# Patient Record
Sex: FEMALE | Employment: UNEMPLOYED | URBAN - METROPOLITAN AREA
[De-identification: names, ages, dates, MRNs, and addresses within clinical notes are randomized per-mention and may not be internally consistent; named-entity substitution may affect disease eponyms.]

---

## 2021-01-13 ENCOUNTER — OFFICE VISIT (OUTPATIENT)
Dept: URGENT CARE | Facility: CLINIC | Age: 26
End: 2021-01-13
Payer: MEDICAID

## 2021-01-13 VITALS
SYSTOLIC BLOOD PRESSURE: 107 MMHG | RESPIRATION RATE: 18 BRPM | OXYGEN SATURATION: 98 % | DIASTOLIC BLOOD PRESSURE: 69 MMHG | HEART RATE: 76 BPM | TEMPERATURE: 99 F | HEIGHT: 61 IN | WEIGHT: 135 LBS | BODY MASS INDEX: 25.49 KG/M2

## 2021-01-13 DIAGNOSIS — L30.9 HAND ECZEMA: ICD-10-CM

## 2021-01-13 DIAGNOSIS — H00.024 HORDEOLUM INTERNUM LEFT UPPER EYELID: Primary | ICD-10-CM

## 2021-01-13 PROCEDURE — 99203 OFFICE O/P NEW LOW 30 MIN: CPT | Mod: S$GLB,,, | Performed by: PHYSICIAN ASSISTANT

## 2021-01-13 PROCEDURE — 99203 PR OFFICE/OUTPT VISIT, NEW, LEVL III, 30-44 MIN: ICD-10-PCS | Mod: S$GLB,,, | Performed by: PHYSICIAN ASSISTANT

## 2021-01-13 RX ORDER — POLYMYXIN B SULFATE AND TRIMETHOPRIM 1; 10000 MG/ML; [USP'U]/ML
1 SOLUTION OPHTHALMIC EVERY 6 HOURS
Qty: 10 ML | Refills: 0 | Status: SHIPPED | OUTPATIENT
Start: 2021-01-13 | End: 2021-01-20

## 2021-01-13 RX ORDER — TRIAMCINOLONE ACETONIDE 1 MG/G
CREAM TOPICAL 2 TIMES DAILY
Qty: 45 G | Refills: 0 | Status: SHIPPED | OUTPATIENT
Start: 2021-01-13 | End: 2021-01-23

## 2021-02-14 ENCOUNTER — OFFICE VISIT (OUTPATIENT)
Dept: URGENT CARE | Facility: CLINIC | Age: 26
End: 2021-02-14
Payer: MEDICAID

## 2021-02-14 VITALS
OXYGEN SATURATION: 98 % | WEIGHT: 132 LBS | DIASTOLIC BLOOD PRESSURE: 68 MMHG | TEMPERATURE: 98 F | HEIGHT: 61 IN | BODY MASS INDEX: 24.92 KG/M2 | SYSTOLIC BLOOD PRESSURE: 96 MMHG | RESPIRATION RATE: 18 BRPM | HEART RATE: 78 BPM

## 2021-02-14 DIAGNOSIS — H00.024 HORDEOLUM INTERNUM LEFT UPPER EYELID: Primary | ICD-10-CM

## 2021-02-14 PROCEDURE — 99213 OFFICE O/P EST LOW 20 MIN: CPT | Mod: S$GLB,,, | Performed by: NURSE PRACTITIONER

## 2021-02-14 PROCEDURE — 99213 PR OFFICE/OUTPT VISIT, EST, LEVL III, 20-29 MIN: ICD-10-PCS | Mod: S$GLB,,, | Performed by: NURSE PRACTITIONER

## 2021-02-14 RX ORDER — ERYTHROMYCIN 5 MG/G
OINTMENT OPHTHALMIC 3 TIMES DAILY
Qty: 3.5 G | Refills: 0 | Status: SHIPPED | OUTPATIENT
Start: 2021-02-14 | End: 2021-02-19

## 2021-04-05 ENCOUNTER — HOSPITAL ENCOUNTER (EMERGENCY)
Facility: HOSPITAL | Age: 26
Discharge: HOME | End: 2021-04-05
Attending: EMERGENCY MEDICINE
Payer: MEDICAID

## 2021-04-05 VITALS
HEIGHT: 61 IN | TEMPERATURE: 98 F | SYSTOLIC BLOOD PRESSURE: 103 MMHG | RESPIRATION RATE: 17 BRPM | BODY MASS INDEX: 25.49 KG/M2 | HEART RATE: 77 BPM | WEIGHT: 135 LBS | DIASTOLIC BLOOD PRESSURE: 55 MMHG | OXYGEN SATURATION: 99 %

## 2021-04-05 DIAGNOSIS — N88.8 CERVICAL DISCHARGE: ICD-10-CM

## 2021-04-05 DIAGNOSIS — N83.201 RIGHT OVARIAN CYST: Primary | ICD-10-CM

## 2021-04-05 LAB
ALBUMIN SERPL BCP-MCNC: 4.6 G/DL (ref 3.5–5.2)
ALP SERPL-CCNC: 74 U/L (ref 55–135)
ALT SERPL W/O P-5'-P-CCNC: 11 U/L (ref 10–44)
ANION GAP SERPL CALC-SCNC: 9 MMOL/L (ref 8–16)
AST SERPL-CCNC: 24 U/L (ref 10–40)
B-HCG UR QL: NEGATIVE
BACTERIA GENITAL QL WET PREP: ABNORMAL
BASOPHILS # BLD AUTO: 0.04 K/UL (ref 0–0.2)
BASOPHILS NFR BLD: 0.6 % (ref 0–1.9)
BILIRUB SERPL-MCNC: 0.2 MG/DL (ref 0.1–1)
BILIRUB UR QL STRIP: NEGATIVE
BUN SERPL-MCNC: 24 MG/DL (ref 6–20)
CALCIUM SERPL-MCNC: 8.8 MG/DL (ref 8.7–10.5)
CHLORIDE SERPL-SCNC: 103 MMOL/L (ref 95–110)
CLARITY UR: CLEAR
CLUE CELLS VAG QL WET PREP: ABNORMAL
CO2 SERPL-SCNC: 22 MMOL/L (ref 23–29)
COLOR UR: YELLOW
CREAT SERPL-MCNC: 0.7 MG/DL (ref 0.5–1.4)
CTP QC/QA: YES
DIFFERENTIAL METHOD: ABNORMAL
EOSINOPHIL # BLD AUTO: 0.1 K/UL (ref 0–0.5)
EOSINOPHIL NFR BLD: 1.4 % (ref 0–8)
ERYTHROCYTE [DISTWIDTH] IN BLOOD BY AUTOMATED COUNT: 12.9 % (ref 11.5–14.5)
EST. GFR  (AFRICAN AMERICAN): >60 ML/MIN/1.73 M^2
EST. GFR  (NON AFRICAN AMERICAN): >60 ML/MIN/1.73 M^2
FILAMENT FUNGI VAG WET PREP-#/AREA: ABNORMAL
GLUCOSE SERPL-MCNC: 92 MG/DL (ref 70–110)
GLUCOSE UR QL STRIP: NEGATIVE
HCT VFR BLD AUTO: 39.5 % (ref 37–48.5)
HGB BLD-MCNC: 12.6 G/DL (ref 12–16)
HGB UR QL STRIP: NEGATIVE
IMM GRANULOCYTES # BLD AUTO: 0.01 K/UL (ref 0–0.04)
IMM GRANULOCYTES NFR BLD AUTO: 0.1 % (ref 0–0.5)
KETONES UR QL STRIP: NEGATIVE
LEUKOCYTE ESTERASE UR QL STRIP: NEGATIVE
LYMPHOCYTES # BLD AUTO: 2 K/UL (ref 1–4.8)
LYMPHOCYTES NFR BLD: 27 % (ref 18–48)
MCH RBC QN AUTO: 26.3 PG (ref 27–31)
MCHC RBC AUTO-ENTMCNC: 31.9 G/DL (ref 32–36)
MCV RBC AUTO: 82 FL (ref 82–98)
MONOCYTES # BLD AUTO: 0.7 K/UL (ref 0.3–1)
MONOCYTES NFR BLD: 9.5 % (ref 4–15)
NEUTROPHILS # BLD AUTO: 4.5 K/UL (ref 1.8–7.7)
NEUTROPHILS NFR BLD: 61.4 % (ref 38–73)
NITRITE UR QL STRIP: NEGATIVE
NRBC BLD-RTO: 0 /100 WBC
PH UR STRIP: 5 [PH] (ref 5–8)
PLATELET # BLD AUTO: 208 K/UL (ref 150–450)
PMV BLD AUTO: 12.3 FL (ref 9.2–12.9)
POTASSIUM SERPL-SCNC: 4.7 MMOL/L (ref 3.5–5.1)
PROT SERPL-MCNC: 7.8 G/DL (ref 6–8.4)
PROT UR QL STRIP: NEGATIVE
RBC # BLD AUTO: 4.8 M/UL (ref 4–5.4)
SODIUM SERPL-SCNC: 134 MMOL/L (ref 136–145)
SP GR UR STRIP: 1.03 (ref 1–1.03)
SPECIMEN SOURCE: ABNORMAL
T VAGINALIS GENITAL QL WET PREP: ABNORMAL
URN SPEC COLLECT METH UR: NORMAL
UROBILINOGEN UR STRIP-ACNC: NEGATIVE EU/DL
WBC # BLD AUTO: 7.26 K/UL (ref 3.9–12.7)
WBC #/AREA VAG WET PREP: ABNORMAL
YEAST GENITAL QL WET PREP: ABNORMAL

## 2021-04-05 PROCEDURE — 85025 COMPLETE CBC W/AUTO DIFF WBC: CPT | Performed by: EMERGENCY MEDICINE

## 2021-04-05 PROCEDURE — 96374 THER/PROPH/DIAG INJ IV PUSH: CPT

## 2021-04-05 PROCEDURE — 99285 EMERGENCY DEPT VISIT HI MDM: CPT | Mod: 25

## 2021-04-05 PROCEDURE — 25500020 PHARM REV CODE 255: Performed by: EMERGENCY MEDICINE

## 2021-04-05 PROCEDURE — 81025 URINE PREGNANCY TEST: CPT | Performed by: EMERGENCY MEDICINE

## 2021-04-05 PROCEDURE — 80053 COMPREHEN METABOLIC PANEL: CPT | Performed by: EMERGENCY MEDICINE

## 2021-04-05 PROCEDURE — 87210 SMEAR WET MOUNT SALINE/INK: CPT | Performed by: EMERGENCY MEDICINE

## 2021-04-05 PROCEDURE — 87491 CHLMYD TRACH DNA AMP PROBE: CPT | Performed by: EMERGENCY MEDICINE

## 2021-04-05 PROCEDURE — 96375 TX/PRO/DX INJ NEW DRUG ADDON: CPT

## 2021-04-05 PROCEDURE — 81003 URINALYSIS AUTO W/O SCOPE: CPT | Performed by: EMERGENCY MEDICINE

## 2021-04-05 PROCEDURE — 87591 N.GONORRHOEAE DNA AMP PROB: CPT | Performed by: EMERGENCY MEDICINE

## 2021-04-05 PROCEDURE — 63600175 PHARM REV CODE 636 W HCPCS: Performed by: EMERGENCY MEDICINE

## 2021-04-05 RX ORDER — ONDANSETRON 2 MG/ML
4 INJECTION INTRAMUSCULAR; INTRAVENOUS
Status: COMPLETED | OUTPATIENT
Start: 2021-04-05 | End: 2021-04-05

## 2021-04-05 RX ORDER — TRAMADOL HYDROCHLORIDE 50 MG/1
50 TABLET ORAL EVERY 8 HOURS PRN
Qty: 6 TABLET | Refills: 0 | Status: SHIPPED | OUTPATIENT
Start: 2021-04-05

## 2021-04-05 RX ORDER — KETOROLAC TROMETHAMINE 30 MG/ML
15 INJECTION, SOLUTION INTRAMUSCULAR; INTRAVENOUS
Status: COMPLETED | OUTPATIENT
Start: 2021-04-05 | End: 2021-04-05

## 2021-04-05 RX ORDER — NAPROXEN 500 MG/1
500 TABLET ORAL 2 TIMES DAILY PRN
Qty: 30 TABLET | Refills: 0 | OUTPATIENT
Start: 2021-04-05 | End: 2021-08-11

## 2021-04-05 RX ORDER — FENTANYL CITRATE 50 UG/ML
50 INJECTION, SOLUTION INTRAMUSCULAR; INTRAVENOUS
Status: COMPLETED | OUTPATIENT
Start: 2021-04-05 | End: 2021-04-05

## 2021-04-05 RX ADMIN — IOHEXOL 75 ML: 350 INJECTION, SOLUTION INTRAVENOUS at 07:04

## 2021-04-05 RX ADMIN — FENTANYL CITRATE 50 MCG: 50 INJECTION, SOLUTION INTRAMUSCULAR; INTRAVENOUS at 07:04

## 2021-04-05 RX ADMIN — ONDANSETRON 4 MG: 2 INJECTION INTRAMUSCULAR; INTRAVENOUS at 08:04

## 2021-04-05 RX ADMIN — KETOROLAC TROMETHAMINE 15 MG: 30 INJECTION, SOLUTION INTRAMUSCULAR; INTRAVENOUS at 07:04

## 2021-04-06 LAB
C TRACH DNA SPEC QL NAA+PROBE: NOT DETECTED
N GONORRHOEA DNA SPEC QL NAA+PROBE: NOT DETECTED

## 2021-07-30 ENCOUNTER — OFFICE VISIT (OUTPATIENT)
Dept: OBSTETRICS AND GYNECOLOGY | Facility: CLINIC | Age: 26
End: 2021-07-30
Payer: MEDICAID

## 2021-07-30 VITALS
HEIGHT: 61 IN | DIASTOLIC BLOOD PRESSURE: 80 MMHG | BODY MASS INDEX: 25.04 KG/M2 | WEIGHT: 132.63 LBS | SYSTOLIC BLOOD PRESSURE: 118 MMHG

## 2021-07-30 DIAGNOSIS — N88.8 CERVICAL DISCHARGE: ICD-10-CM

## 2021-07-30 DIAGNOSIS — Z11.3 SCREENING FOR STD (SEXUALLY TRANSMITTED DISEASE): Primary | ICD-10-CM

## 2021-07-30 DIAGNOSIS — Z01.419 WELL WOMAN EXAM WITH ROUTINE GYNECOLOGICAL EXAM: ICD-10-CM

## 2021-07-30 DIAGNOSIS — Z30.45 ENCOUNTER FOR SURVEILLANCE OF TRANSDERMAL PATCH HORMONAL CONTRACEPTIVE DEVICE: ICD-10-CM

## 2021-07-30 DIAGNOSIS — N83.201 RIGHT OVARIAN CYST: ICD-10-CM

## 2021-07-30 PROCEDURE — 87591 N.GONORRHOEAE DNA AMP PROB: CPT | Performed by: OBSTETRICS & GYNECOLOGY

## 2021-07-30 PROCEDURE — 81025 URINE PREGNANCY TEST: CPT | Mod: PBBFAC,PO | Performed by: OBSTETRICS & GYNECOLOGY

## 2021-07-30 PROCEDURE — 88175 CYTOPATH C/V AUTO FLUID REDO: CPT | Performed by: OBSTETRICS & GYNECOLOGY

## 2021-07-30 PROCEDURE — 99213 OFFICE O/P EST LOW 20 MIN: CPT | Mod: PBBFAC,PO | Performed by: OBSTETRICS & GYNECOLOGY

## 2021-07-30 PROCEDURE — 87491 CHLMYD TRACH DNA AMP PROBE: CPT | Performed by: OBSTETRICS & GYNECOLOGY

## 2021-07-30 PROCEDURE — 99385 PREV VISIT NEW AGE 18-39: CPT | Mod: S$PBB,,, | Performed by: OBSTETRICS & GYNECOLOGY

## 2021-07-30 PROCEDURE — 99385 PR PREVENTIVE VISIT,NEW,18-39: ICD-10-PCS | Mod: S$PBB,,, | Performed by: OBSTETRICS & GYNECOLOGY

## 2021-07-30 PROCEDURE — 99999 PR PBB SHADOW E&M-EST. PATIENT-LVL III: ICD-10-PCS | Mod: PBBFAC,,, | Performed by: OBSTETRICS & GYNECOLOGY

## 2021-07-30 PROCEDURE — 99999 PR PBB SHADOW E&M-EST. PATIENT-LVL III: CPT | Mod: PBBFAC,,, | Performed by: OBSTETRICS & GYNECOLOGY

## 2021-07-30 RX ORDER — NORELGESTROMIN AND ETHINYL ESTRADIOL 35; 150 UG/MG; UG/MG
1 PATCH TRANSDERMAL
Qty: 4 PATCH | Refills: 11 | Status: SHIPPED | OUTPATIENT
Start: 2021-07-30 | End: 2022-07-30

## 2021-08-04 LAB
C TRACH DNA SPEC QL NAA+PROBE: NOT DETECTED
N GONORRHOEA DNA SPEC QL NAA+PROBE: NOT DETECTED

## 2021-08-07 LAB
FINAL PATHOLOGIC DIAGNOSIS: NORMAL
Lab: NORMAL

## 2021-08-08 ENCOUNTER — HOSPITAL ENCOUNTER (EMERGENCY)
Facility: HOSPITAL | Age: 26
Discharge: HOME | End: 2021-08-08
Attending: EMERGENCY MEDICINE
Payer: MEDICAID

## 2021-08-08 VITALS
OXYGEN SATURATION: 99 % | BODY MASS INDEX: 25.49 KG/M2 | SYSTOLIC BLOOD PRESSURE: 102 MMHG | RESPIRATION RATE: 15 BRPM | HEART RATE: 87 BPM | WEIGHT: 135 LBS | TEMPERATURE: 99 F | HEIGHT: 61 IN | DIASTOLIC BLOOD PRESSURE: 60 MMHG

## 2021-08-08 DIAGNOSIS — S29.011A MUSCLE STRAIN OF CHEST WALL, INITIAL ENCOUNTER: Primary | ICD-10-CM

## 2021-08-08 DIAGNOSIS — R07.9 CHEST PAIN: ICD-10-CM

## 2021-08-08 DIAGNOSIS — R52 PAIN: ICD-10-CM

## 2021-08-08 LAB
B-HCG UR QL: NEGATIVE
CTP QC/QA: YES
CTP QC/QA: YES
SARS-COV-2 RDRP RESP QL NAA+PROBE: NEGATIVE

## 2021-08-08 PROCEDURE — 99284 EMERGENCY DEPT VISIT MOD MDM: CPT | Mod: 25

## 2021-08-08 PROCEDURE — 93010 EKG 12-LEAD: ICD-10-PCS | Mod: ,,, | Performed by: INTERNAL MEDICINE

## 2021-08-08 PROCEDURE — 93005 ELECTROCARDIOGRAM TRACING: CPT

## 2021-08-08 PROCEDURE — 25000003 PHARM REV CODE 250: Performed by: EMERGENCY MEDICINE

## 2021-08-08 PROCEDURE — 81025 URINE PREGNANCY TEST: CPT | Performed by: NURSE PRACTITIONER

## 2021-08-08 PROCEDURE — U0002 COVID-19 LAB TEST NON-CDC: HCPCS | Performed by: EMERGENCY MEDICINE

## 2021-08-08 PROCEDURE — 93010 ELECTROCARDIOGRAM REPORT: CPT | Mod: ,,, | Performed by: INTERNAL MEDICINE

## 2021-08-08 RX ORDER — IBUPROFEN 600 MG/1
600 TABLET ORAL EVERY 6 HOURS PRN
Qty: 20 TABLET | Refills: 0 | OUTPATIENT
Start: 2021-08-08 | End: 2021-08-11

## 2021-08-08 RX ORDER — ACETAMINOPHEN 500 MG
TABLET ORAL
COMMUNITY

## 2021-08-08 RX ORDER — ACETAMINOPHEN 500 MG
1000 TABLET ORAL EVERY 6 HOURS PRN
Qty: 50 TABLET | Refills: 0 | OUTPATIENT
Start: 2021-08-08 | End: 2023-05-30

## 2021-08-08 RX ORDER — ACETAMINOPHEN 500 MG
1000 TABLET ORAL
Status: COMPLETED | OUTPATIENT
Start: 2021-08-08 | End: 2021-08-08

## 2021-08-08 RX ADMIN — ACETAMINOPHEN 1000 MG: 500 TABLET ORAL at 11:08

## 2021-08-11 ENCOUNTER — HOSPITAL ENCOUNTER (EMERGENCY)
Facility: HOSPITAL | Age: 26
Discharge: HOME | End: 2021-08-11
Attending: EMERGENCY MEDICINE
Payer: MEDICAID

## 2021-08-11 VITALS
OXYGEN SATURATION: 99 % | DIASTOLIC BLOOD PRESSURE: 68 MMHG | HEART RATE: 64 BPM | WEIGHT: 135 LBS | TEMPERATURE: 98 F | RESPIRATION RATE: 16 BRPM | HEIGHT: 61 IN | SYSTOLIC BLOOD PRESSURE: 104 MMHG | BODY MASS INDEX: 25.49 KG/M2

## 2021-08-11 DIAGNOSIS — G44.209 TENSION HEADACHE: ICD-10-CM

## 2021-08-11 DIAGNOSIS — R07.89 CHEST WALL PAIN: Primary | ICD-10-CM

## 2021-08-11 DIAGNOSIS — R07.9 CHEST PAIN: ICD-10-CM

## 2021-08-11 LAB
ANION GAP SERPL CALC-SCNC: 10 MMOL/L (ref 8–16)
B-HCG UR QL: NEGATIVE
BASOPHILS # BLD AUTO: 0.02 K/UL (ref 0–0.2)
BASOPHILS NFR BLD: 0.3 % (ref 0–1.9)
BUN SERPL-MCNC: 14 MG/DL (ref 6–20)
CALCIUM SERPL-MCNC: 8.7 MG/DL (ref 8.7–10.5)
CHLORIDE SERPL-SCNC: 109 MMOL/L (ref 95–110)
CO2 SERPL-SCNC: 16 MMOL/L (ref 23–29)
CREAT SERPL-MCNC: 0.8 MG/DL (ref 0.5–1.4)
CTP QC/QA: YES
CTP QC/QA: YES
DIFFERENTIAL METHOD: ABNORMAL
EOSINOPHIL # BLD AUTO: 0.1 K/UL (ref 0–0.5)
EOSINOPHIL NFR BLD: 1.7 % (ref 0–8)
ERYTHROCYTE [DISTWIDTH] IN BLOOD BY AUTOMATED COUNT: 13.2 % (ref 11.5–14.5)
EST. GFR  (AFRICAN AMERICAN): >60 ML/MIN/1.73 M^2
EST. GFR  (NON AFRICAN AMERICAN): >60 ML/MIN/1.73 M^2
GLUCOSE SERPL-MCNC: 89 MG/DL (ref 70–110)
HCT VFR BLD AUTO: 37.2 % (ref 37–48.5)
HGB BLD-MCNC: 11.9 G/DL (ref 12–16)
IMM GRANULOCYTES # BLD AUTO: 0.02 K/UL (ref 0–0.04)
IMM GRANULOCYTES NFR BLD AUTO: 0.3 % (ref 0–0.5)
LYMPHOCYTES # BLD AUTO: 1.5 K/UL (ref 1–4.8)
LYMPHOCYTES NFR BLD: 21.7 % (ref 18–48)
MCH RBC QN AUTO: 26.8 PG (ref 27–31)
MCHC RBC AUTO-ENTMCNC: 32 G/DL (ref 32–36)
MCV RBC AUTO: 84 FL (ref 82–98)
MONOCYTES # BLD AUTO: 0.5 K/UL (ref 0.3–1)
MONOCYTES NFR BLD: 6.8 % (ref 4–15)
NEUTROPHILS # BLD AUTO: 4.8 K/UL (ref 1.8–7.7)
NEUTROPHILS NFR BLD: 69.2 % (ref 38–73)
NRBC BLD-RTO: 0 /100 WBC
PLATELET # BLD AUTO: 292 K/UL (ref 150–450)
PMV BLD AUTO: 12.2 FL (ref 9.2–12.9)
POTASSIUM SERPL-SCNC: 4.5 MMOL/L (ref 3.5–5.1)
RBC # BLD AUTO: 4.44 M/UL (ref 4–5.4)
SARS-COV-2 RDRP RESP QL NAA+PROBE: NEGATIVE
SODIUM SERPL-SCNC: 135 MMOL/L (ref 136–145)
WBC # BLD AUTO: 6.91 K/UL (ref 3.9–12.7)

## 2021-08-11 PROCEDURE — 93010 ELECTROCARDIOGRAM REPORT: CPT | Mod: ,,, | Performed by: INTERNAL MEDICINE

## 2021-08-11 PROCEDURE — 63600175 PHARM REV CODE 636 W HCPCS: Performed by: NURSE PRACTITIONER

## 2021-08-11 PROCEDURE — 96374 THER/PROPH/DIAG INJ IV PUSH: CPT

## 2021-08-11 PROCEDURE — 99284 EMERGENCY DEPT VISIT MOD MDM: CPT | Mod: 25

## 2021-08-11 PROCEDURE — 80048 BASIC METABOLIC PNL TOTAL CA: CPT | Performed by: NURSE PRACTITIONER

## 2021-08-11 PROCEDURE — 81025 URINE PREGNANCY TEST: CPT | Performed by: NURSE PRACTITIONER

## 2021-08-11 PROCEDURE — 85025 COMPLETE CBC W/AUTO DIFF WBC: CPT | Performed by: NURSE PRACTITIONER

## 2021-08-11 PROCEDURE — 93005 ELECTROCARDIOGRAM TRACING: CPT

## 2021-08-11 PROCEDURE — U0002 COVID-19 LAB TEST NON-CDC: HCPCS | Performed by: EMERGENCY MEDICINE

## 2021-08-11 PROCEDURE — 93010 EKG 12-LEAD: ICD-10-PCS | Mod: ,,, | Performed by: INTERNAL MEDICINE

## 2021-08-11 RX ORDER — KETOROLAC TROMETHAMINE 30 MG/ML
15 INJECTION, SOLUTION INTRAMUSCULAR; INTRAVENOUS
Status: COMPLETED | OUTPATIENT
Start: 2021-08-11 | End: 2021-08-11

## 2021-08-11 RX ORDER — NAPROXEN 500 MG/1
500 TABLET ORAL 2 TIMES DAILY WITH MEALS
Qty: 14 TABLET | Refills: 0 | OUTPATIENT
Start: 2021-08-11 | End: 2023-05-30

## 2021-08-11 RX ADMIN — KETOROLAC TROMETHAMINE 15 MG: 30 INJECTION, SOLUTION INTRAMUSCULAR; INTRAVENOUS at 06:08

## 2021-08-14 ENCOUNTER — HOSPITAL ENCOUNTER (EMERGENCY)
Facility: HOSPITAL | Age: 26
Discharge: HOME | End: 2021-08-14
Attending: EMERGENCY MEDICINE
Payer: MEDICAID

## 2021-08-14 VITALS
SYSTOLIC BLOOD PRESSURE: 110 MMHG | WEIGHT: 135 LBS | HEART RATE: 82 BPM | OXYGEN SATURATION: 100 % | HEIGHT: 61 IN | TEMPERATURE: 98 F | DIASTOLIC BLOOD PRESSURE: 68 MMHG | RESPIRATION RATE: 17 BRPM | BODY MASS INDEX: 25.49 KG/M2

## 2021-08-14 DIAGNOSIS — R07.9 CHEST PAIN: ICD-10-CM

## 2021-08-14 DIAGNOSIS — M94.0 COSTOCHONDRITIS: Primary | ICD-10-CM

## 2021-08-14 LAB
ANION GAP SERPL CALC-SCNC: 8 MMOL/L (ref 8–16)
BASOPHILS # BLD AUTO: 0.02 K/UL (ref 0–0.2)
BASOPHILS NFR BLD: 0.3 % (ref 0–1.9)
BUN SERPL-MCNC: 15 MG/DL (ref 6–20)
CALCIUM SERPL-MCNC: 8.9 MG/DL (ref 8.7–10.5)
CHLORIDE SERPL-SCNC: 106 MMOL/L (ref 95–110)
CO2 SERPL-SCNC: 23 MMOL/L (ref 23–29)
CREAT SERPL-MCNC: 0.7 MG/DL (ref 0.5–1.4)
D DIMER PPP IA.FEU-MCNC: <0.19 MG/L FEU
DIFFERENTIAL METHOD: ABNORMAL
EOSINOPHIL # BLD AUTO: 0.1 K/UL (ref 0–0.5)
EOSINOPHIL NFR BLD: 1.4 % (ref 0–8)
ERYTHROCYTE [DISTWIDTH] IN BLOOD BY AUTOMATED COUNT: 13.3 % (ref 11.5–14.5)
EST. GFR  (AFRICAN AMERICAN): >60 ML/MIN/1.73 M^2
EST. GFR  (NON AFRICAN AMERICAN): >60 ML/MIN/1.73 M^2
GLUCOSE SERPL-MCNC: 79 MG/DL (ref 70–110)
HCT VFR BLD AUTO: 37.4 % (ref 37–48.5)
HGB BLD-MCNC: 11.9 G/DL (ref 12–16)
IMM GRANULOCYTES # BLD AUTO: 0.01 K/UL (ref 0–0.04)
IMM GRANULOCYTES NFR BLD AUTO: 0.1 % (ref 0–0.5)
LIPASE SERPL-CCNC: 33 U/L (ref 4–60)
LYMPHOCYTES # BLD AUTO: 1.5 K/UL (ref 1–4.8)
LYMPHOCYTES NFR BLD: 21.1 % (ref 18–48)
MCH RBC QN AUTO: 26.4 PG (ref 27–31)
MCHC RBC AUTO-ENTMCNC: 31.8 G/DL (ref 32–36)
MCV RBC AUTO: 83 FL (ref 82–98)
MONOCYTES # BLD AUTO: 0.6 K/UL (ref 0.3–1)
MONOCYTES NFR BLD: 9 % (ref 4–15)
NEUTROPHILS # BLD AUTO: 4.8 K/UL (ref 1.8–7.7)
NEUTROPHILS NFR BLD: 68.1 % (ref 38–73)
NRBC BLD-RTO: 0 /100 WBC
PLATELET # BLD AUTO: 194 K/UL (ref 150–450)
PMV BLD AUTO: 11.2 FL (ref 9.2–12.9)
POTASSIUM SERPL-SCNC: 4.3 MMOL/L (ref 3.5–5.1)
RBC # BLD AUTO: 4.51 M/UL (ref 4–5.4)
SODIUM SERPL-SCNC: 137 MMOL/L (ref 136–145)
TROPONIN I SERPL DL<=0.01 NG/ML-MCNC: <0.006 NG/ML (ref 0–0.03)
WBC # BLD AUTO: 6.98 K/UL (ref 3.9–12.7)

## 2021-08-14 PROCEDURE — 83690 ASSAY OF LIPASE: CPT | Performed by: EMERGENCY MEDICINE

## 2021-08-14 PROCEDURE — 63600175 PHARM REV CODE 636 W HCPCS: Performed by: EMERGENCY MEDICINE

## 2021-08-14 PROCEDURE — 85379 FIBRIN DEGRADATION QUANT: CPT | Performed by: EMERGENCY MEDICINE

## 2021-08-14 PROCEDURE — 25000003 PHARM REV CODE 250: Performed by: EMERGENCY MEDICINE

## 2021-08-14 PROCEDURE — 80048 BASIC METABOLIC PNL TOTAL CA: CPT | Performed by: EMERGENCY MEDICINE

## 2021-08-14 PROCEDURE — 99284 EMERGENCY DEPT VISIT MOD MDM: CPT | Mod: 25

## 2021-08-14 PROCEDURE — 85025 COMPLETE CBC W/AUTO DIFF WBC: CPT | Performed by: EMERGENCY MEDICINE

## 2021-08-14 PROCEDURE — 93010 ELECTROCARDIOGRAM REPORT: CPT | Mod: ,,, | Performed by: INTERNAL MEDICINE

## 2021-08-14 PROCEDURE — 84484 ASSAY OF TROPONIN QUANT: CPT | Performed by: EMERGENCY MEDICINE

## 2021-08-14 PROCEDURE — 93010 EKG 12-LEAD: ICD-10-PCS | Mod: ,,, | Performed by: INTERNAL MEDICINE

## 2021-08-14 PROCEDURE — 96374 THER/PROPH/DIAG INJ IV PUSH: CPT

## 2021-08-14 PROCEDURE — 93005 ELECTROCARDIOGRAM TRACING: CPT

## 2021-08-14 RX ORDER — KETOROLAC TROMETHAMINE 30 MG/ML
30 INJECTION, SOLUTION INTRAMUSCULAR; INTRAVENOUS
Status: COMPLETED | OUTPATIENT
Start: 2021-08-14 | End: 2021-08-14

## 2021-08-14 RX ORDER — HYDROCODONE BITARTRATE AND ACETAMINOPHEN 5; 325 MG/1; MG/1
1 TABLET ORAL EVERY 4 HOURS PRN
Qty: 12 TABLET | Refills: 0 | OUTPATIENT
Start: 2021-08-14 | End: 2023-05-30

## 2021-08-14 RX ADMIN — LIDOCAINE HYDROCHLORIDE: 20 SOLUTION ORAL; TOPICAL at 04:08

## 2021-08-14 RX ADMIN — KETOROLAC TROMETHAMINE 30 MG: 30 INJECTION, SOLUTION INTRAMUSCULAR; INTRAVENOUS at 03:08

## 2021-12-16 ENCOUNTER — OFFICE VISIT (OUTPATIENT)
Dept: URGENT CARE | Facility: CLINIC | Age: 26
End: 2021-12-16
Payer: MEDICAID

## 2021-12-16 VITALS
SYSTOLIC BLOOD PRESSURE: 106 MMHG | DIASTOLIC BLOOD PRESSURE: 70 MMHG | BODY MASS INDEX: 25.49 KG/M2 | HEIGHT: 61 IN | OXYGEN SATURATION: 98 % | RESPIRATION RATE: 18 BRPM | HEART RATE: 79 BPM | TEMPERATURE: 98 F | WEIGHT: 135 LBS

## 2021-12-16 DIAGNOSIS — R07.9 CHEST PAIN, UNSPECIFIED TYPE: Primary | ICD-10-CM

## 2021-12-16 DIAGNOSIS — R51.9 ACUTE INTRACTABLE HEADACHE, UNSPECIFIED HEADACHE TYPE: ICD-10-CM

## 2021-12-16 DIAGNOSIS — R20.8 DECREASED SENSATION OF FOOT: ICD-10-CM

## 2021-12-16 PROCEDURE — 93005 EKG 12-LEAD: ICD-10-PCS | Mod: S$GLB,,,

## 2021-12-16 PROCEDURE — 93010 EKG 12-LEAD: ICD-10-PCS | Mod: S$PBB,,, | Performed by: INTERNAL MEDICINE

## 2021-12-16 PROCEDURE — 93010 ELECTROCARDIOGRAM REPORT: CPT | Mod: S$PBB,,, | Performed by: INTERNAL MEDICINE

## 2021-12-16 PROCEDURE — 93005 ELECTROCARDIOGRAM TRACING: CPT | Mod: S$GLB,,,

## 2021-12-16 PROCEDURE — 99213 OFFICE O/P EST LOW 20 MIN: CPT | Mod: S$GLB,,,

## 2021-12-16 PROCEDURE — 99213 PR OFFICE/OUTPT VISIT, EST, LEVL III, 20-29 MIN: ICD-10-PCS | Mod: S$GLB,,,

## 2021-12-16 RX ORDER — IBUPROFEN 800 MG/1
800 TABLET ORAL 3 TIMES DAILY
Qty: 30 TABLET | Refills: 0 | Status: SHIPPED | OUTPATIENT
Start: 2021-12-16 | End: 2021-12-26

## 2021-12-17 ENCOUNTER — TELEPHONE (OUTPATIENT)
Dept: URGENT CARE | Facility: CLINIC | Age: 26
End: 2021-12-17
Payer: MEDICAID

## 2022-03-09 ENCOUNTER — OFFICE VISIT (OUTPATIENT)
Dept: URGENT CARE | Facility: CLINIC | Age: 27
End: 2022-03-09
Payer: MEDICAID

## 2022-03-09 VITALS
SYSTOLIC BLOOD PRESSURE: 98 MMHG | OXYGEN SATURATION: 98 % | RESPIRATION RATE: 16 BRPM | TEMPERATURE: 99 F | BODY MASS INDEX: 25.49 KG/M2 | DIASTOLIC BLOOD PRESSURE: 68 MMHG | HEART RATE: 82 BPM | HEIGHT: 61 IN | WEIGHT: 135 LBS

## 2022-03-09 DIAGNOSIS — M25.562 PAIN IN BOTH KNEES, UNSPECIFIED CHRONICITY: Primary | ICD-10-CM

## 2022-03-09 DIAGNOSIS — Z76.89 ENCOUNTER TO ESTABLISH CARE WITH NEW DOCTOR: ICD-10-CM

## 2022-03-09 DIAGNOSIS — M25.522 LEFT ELBOW PAIN: ICD-10-CM

## 2022-03-09 DIAGNOSIS — M25.561 PAIN IN BOTH KNEES, UNSPECIFIED CHRONICITY: Primary | ICD-10-CM

## 2022-03-09 PROCEDURE — 3078F PR MOST RECENT DIASTOLIC BLOOD PRESSURE < 80 MM HG: ICD-10-PCS | Mod: CPTII,S$GLB,, | Performed by: NURSE PRACTITIONER

## 2022-03-09 PROCEDURE — 3008F PR BODY MASS INDEX (BMI) DOCUMENTED: ICD-10-PCS | Mod: CPTII,S$GLB,, | Performed by: NURSE PRACTITIONER

## 2022-03-09 PROCEDURE — 3078F DIAST BP <80 MM HG: CPT | Mod: CPTII,S$GLB,, | Performed by: NURSE PRACTITIONER

## 2022-03-09 PROCEDURE — 3074F SYST BP LT 130 MM HG: CPT | Mod: CPTII,S$GLB,, | Performed by: NURSE PRACTITIONER

## 2022-03-09 PROCEDURE — 3008F BODY MASS INDEX DOCD: CPT | Mod: CPTII,S$GLB,, | Performed by: NURSE PRACTITIONER

## 2022-03-09 PROCEDURE — 1159F PR MEDICATION LIST DOCUMENTED IN MEDICAL RECORD: ICD-10-PCS | Mod: CPTII,S$GLB,, | Performed by: NURSE PRACTITIONER

## 2022-03-09 PROCEDURE — 1159F MED LIST DOCD IN RCRD: CPT | Mod: CPTII,S$GLB,, | Performed by: NURSE PRACTITIONER

## 2022-03-09 PROCEDURE — 99213 PR OFFICE/OUTPT VISIT, EST, LEVL III, 20-29 MIN: ICD-10-PCS | Mod: S$GLB,,, | Performed by: NURSE PRACTITIONER

## 2022-03-09 PROCEDURE — 1160F RVW MEDS BY RX/DR IN RCRD: CPT | Mod: CPTII,S$GLB,, | Performed by: NURSE PRACTITIONER

## 2022-03-09 PROCEDURE — 99213 OFFICE O/P EST LOW 20 MIN: CPT | Mod: S$GLB,,, | Performed by: NURSE PRACTITIONER

## 2022-03-09 PROCEDURE — 1160F PR REVIEW ALL MEDS BY PRESCRIBER/CLIN PHARMACIST DOCUMENTED: ICD-10-PCS | Mod: CPTII,S$GLB,, | Performed by: NURSE PRACTITIONER

## 2022-03-09 PROCEDURE — 3074F PR MOST RECENT SYSTOLIC BLOOD PRESSURE < 130 MM HG: ICD-10-PCS | Mod: CPTII,S$GLB,, | Performed by: NURSE PRACTITIONER

## 2022-03-09 NOTE — PROGRESS NOTES
"Subjective:       Patient ID: Cheryl Mayer is a 27 y.o. female.    Vitals:  height is 5' 1" (1.549 m) and weight is 61.2 kg (135 lb). Her temperature is 98.9 °F (37.2 °C). Her blood pressure is 98/68 and her pulse is 82. Her respiration is 16 and oxygen saturation is 98%.     Chief Complaint: Knee Pain    Pt presents to the clinic with a c/o bilateral knee pain and L elbow pain for 3 day  Provider note below:  This is a 27 y.o. female who presents today with a chief complaint of bilateral knee pain started 3 days ago, denies any trauma, fall or injury, denies numbness or tingling, denies any radiating pain, patient also reports left elbow pain, denies any trauma related to elbow, denies fever, body aches or chills, denies cough, wheezing or shortness of breath, denies nausea, vomiting, diarrhea or abdominal pain, denies chest pain or dizziness positional lightheadedness, denies sore throat or trouble swallowing, denies loss of taste or smell, or any other symptoms, patient is requesting excuse for work        Knee Pain   The incident occurred 3 to 5 days ago. Incident location: no injury. There was no injury mechanism. The pain is present in the right knee and left knee. The pain is at a severity of 8/10. The pain is moderate. Pertinent negatives include no inability to bear weight, loss of motion, loss of sensation, muscle weakness, numbness or tingling. She reports no foreign bodies present. Nothing aggravates the symptoms. She has tried nothing for the symptoms.       Constitution: Negative for chills, sweating, fatigue and fever.   HENT: Negative for postnasal drip, sinus pain, sinus pressure and sore throat.    Cardiovascular: Negative for chest pain.   Respiratory: Negative for chest tightness, cough, sputum production and bloody sputum.    Gastrointestinal: Negative for abdominal pain.   Musculoskeletal: Positive for pain.   Skin: Negative for erythema.   Allergic/Immunologic: Negative for seasonal " allergies.   Neurological: Negative for dizziness, light-headedness and numbness.       Objective:      Physical Exam   Constitutional: She is oriented to person, place, and time. She appears well-developed.   HENT:   Head: Normocephalic and atraumatic. Head is without abrasion, without contusion and without laceration.   Ears:   Right Ear: External ear normal.   Left Ear: External ear normal.   Nose: Nose normal.   Mouth/Throat: Oropharynx is clear and moist and mucous membranes are normal.   Eyes: Conjunctivae, EOM and lids are normal. Pupils are equal, round, and reactive to light.   Neck: Trachea normal and phonation normal. Neck supple.   Cardiovascular: Normal rate, regular rhythm and normal heart sounds.   Pulmonary/Chest: Effort normal and breath sounds normal. No stridor. No respiratory distress.   Musculoskeletal: Normal range of motion.         General: Normal range of motion.      Right elbow: Normal.     Left elbow: Normal.      Right knee: Normal.      Left knee: Normal.      Comments: Cap refill 2 seconds, left radial pulse 2+, sensation intact  Full range of motion intact of left elbow/arm  No erythema, swelling or tenderness noted to left elbow, no open wound or lesion noted      Full range of motion bilaterally with 5/5 strength  Able to ambulate with smooth rhythmic gait  No swelling, erythema or tenderness noted to bilateral knee  No clicking, popping or crepitus noted  Able to extend knee without difficulty             Neurological: She is alert and oriented to person, place, and time.   Skin: Skin is warm, dry, intact and no rash. Capillary refill takes less than 2 seconds. No abrasion, No burn, No bruising, No erythema and No ecchymosis   Psychiatric: Her speech is normal and behavior is normal. Judgment and thought content normal.   Nursing note and vitals reviewed.          Patient in no acute distress.  Vitals reassuring.  Patient declined x-rays at this time.  Supportive treatment and  over-the-counter medications advised.  I reiterated the importance of further evaluation if no improvement in symptoms.  Referral to Family Practice for further evaluation pain  Discussed results/diagnosis/plan in depth with patient in clinic. Strict precautions given to patient to monitor for worsening signs and symptoms. Advised to follow up with primary.All questions answered. Strict ER precautions given. If your symptoms worsens or fail to improve you should go to the Emergency Room. Discharge and follow-up instructions given verbally/printed. Discharge and follow-up instructions discussed with the patient who expressed understanding and willingness to comply with my recommendations.Patient voiced understanding and in agreement with current treatment plan.     Please be advised this text was dictated with [employer] software and may contain errors due to translation.      Assessment:       1. Pain in both knees, unspecified chronicity    2. Left elbow pain    3. Encounter to establish care with new doctor          Plan:         Pain in both knees, unspecified chronicity    Left elbow pain    Encounter to establish care with new doctor  -     Ambulatory referral/consult to Family Practice                 Patient Instructions     Rest - Rest the injured area,      Ice - Apply ice  to affected area for the first 24-48 hours (DO NOT APPLY ICE DIRECTLY TO THE SKIN.  DO NOT LEAVE ON AFFECTED BODY PART FOR MORE THAN 15 MINUTES AT AT TIME TO AVOID INJURY TO SOFT TISSUE)     Compression - Wear ACE wrap or splint provided for compression and comfort to help reduce pain and swelling    Elevate - Elevated affected area higher than your heart to reduce swelling    -  If you were prescribed an anti-inflammatory, please take as directed as needed for pain and inflammation. If you were not prescribed an anti-inflammatory, you can take Tylenol or ibuprofen over the counter as directed for pain unless you have an allergy to them or  medical condition such as stomach ulcers, kidney or liver disease or blood thinners etc for which you should not be taking these type of medications.     Follow up with your PCP in 1 week or as needed if no improvement.      Repeat X ray in 1 week if you still have pain.    If your condition worsens or fails to improve we recommend that you receive another evaluation at the ER immediately or contact your PCP to discuss your concerns or return here.     You must understand that you've received an urgent care treatment only and that you may be released before all your medical problems are known or treated.     You the patient will arrange for followup care as instructed.

## 2022-03-09 NOTE — PATIENT INSTRUCTIONS
Rest - Rest the injured area,      Ice - Apply ice  to affected area for the first 24-48 hours (DO NOT APPLY ICE DIRECTLY TO THE SKIN.  DO NOT LEAVE ON AFFECTED BODY PART FOR MORE THAN 15 MINUTES AT AT TIME TO AVOID INJURY TO SOFT TISSUE)     Compression - Wear ACE wrap or splint provided for compression and comfort to help reduce pain and swelling    Elevate - Elevated affected area higher than your heart to reduce swelling    -  If you were prescribed an anti-inflammatory, please take as directed as needed for pain and inflammation. If you were not prescribed an anti-inflammatory, you can take Tylenol or ibuprofen over the counter as directed for pain unless you have an allergy to them or medical condition such as stomach ulcers, kidney or liver disease or blood thinners etc for which you should not be taking these type of medications.     Follow up with your PCP in 1 week or as needed if no improvement.      Repeat X ray in 1 week if you still have pain.    If your condition worsens or fails to improve we recommend that you receive another evaluation at the ER immediately or contact your PCP to discuss your concerns or return here.     You must understand that you've received an urgent care treatment only and that you may be released before all your medical problems are known or treated.     You the patient will arrange for followup care as instructed.

## 2022-03-09 NOTE — LETTER
March 9, 2022      [facility]  [address] [address]  Phone: [phone]  Fax: [phone]       Patient: Cheryl Mayer   YOB: 1995  Date of Visit: 03/09/2022    To Whom It May Concern:    Pham Mayer  was at [facility] on 03/09/2022. The patient may return to work/school on 03/10/2022 with no restrictions. If you have any questions or concerns, or if I can be of further assistance, please do not hesitate to contact me.    Sincerely,      Lucinda Posey NP

## 2022-09-02 ENCOUNTER — OFFICE VISIT (OUTPATIENT)
Dept: URGENT CARE | Facility: CLINIC | Age: 27
End: 2022-09-02
Payer: MEDICAID

## 2022-09-02 VITALS
SYSTOLIC BLOOD PRESSURE: 111 MMHG | RESPIRATION RATE: 18 BRPM | HEART RATE: 73 BPM | BODY MASS INDEX: 25.49 KG/M2 | OXYGEN SATURATION: 98 % | WEIGHT: 135 LBS | HEIGHT: 61 IN | DIASTOLIC BLOOD PRESSURE: 75 MMHG | TEMPERATURE: 98 F

## 2022-09-02 DIAGNOSIS — M65.4 DE QUERVAIN'S TENOSYNOVITIS, LEFT: Primary | ICD-10-CM

## 2022-09-02 PROCEDURE — 73110 X-RAY EXAM OF WRIST: CPT | Mod: FY,RT,S$GLB, | Performed by: RADIOLOGY

## 2022-09-02 PROCEDURE — 1159F MED LIST DOCD IN RCRD: CPT | Mod: CPTII,S$GLB,, | Performed by: NURSE PRACTITIONER

## 2022-09-02 PROCEDURE — 3074F PR MOST RECENT SYSTOLIC BLOOD PRESSURE < 130 MM HG: ICD-10-PCS | Mod: CPTII,S$GLB,, | Performed by: NURSE PRACTITIONER

## 2022-09-02 PROCEDURE — 99214 OFFICE O/P EST MOD 30 MIN: CPT | Mod: S$GLB,,, | Performed by: NURSE PRACTITIONER

## 2022-09-02 PROCEDURE — 3008F PR BODY MASS INDEX (BMI) DOCUMENTED: ICD-10-PCS | Mod: CPTII,S$GLB,, | Performed by: NURSE PRACTITIONER

## 2022-09-02 PROCEDURE — 3078F DIAST BP <80 MM HG: CPT | Mod: CPTII,S$GLB,, | Performed by: NURSE PRACTITIONER

## 2022-09-02 PROCEDURE — 73110 XR WRIST COMPLETE 3 VIEWS RIGHT: ICD-10-PCS | Mod: FY,RT,S$GLB, | Performed by: RADIOLOGY

## 2022-09-02 PROCEDURE — 99214 PR OFFICE/OUTPT VISIT, EST, LEVL IV, 30-39 MIN: ICD-10-PCS | Mod: S$GLB,,, | Performed by: NURSE PRACTITIONER

## 2022-09-02 PROCEDURE — 3008F BODY MASS INDEX DOCD: CPT | Mod: CPTII,S$GLB,, | Performed by: NURSE PRACTITIONER

## 2022-09-02 PROCEDURE — 3074F SYST BP LT 130 MM HG: CPT | Mod: CPTII,S$GLB,, | Performed by: NURSE PRACTITIONER

## 2022-09-02 PROCEDURE — 1159F PR MEDICATION LIST DOCUMENTED IN MEDICAL RECORD: ICD-10-PCS | Mod: CPTII,S$GLB,, | Performed by: NURSE PRACTITIONER

## 2022-09-02 PROCEDURE — 1160F RVW MEDS BY RX/DR IN RCRD: CPT | Mod: CPTII,S$GLB,, | Performed by: NURSE PRACTITIONER

## 2022-09-02 PROCEDURE — 3078F PR MOST RECENT DIASTOLIC BLOOD PRESSURE < 80 MM HG: ICD-10-PCS | Mod: CPTII,S$GLB,, | Performed by: NURSE PRACTITIONER

## 2022-09-02 PROCEDURE — 1160F PR REVIEW ALL MEDS BY PRESCRIBER/CLIN PHARMACIST DOCUMENTED: ICD-10-PCS | Mod: CPTII,S$GLB,, | Performed by: NURSE PRACTITIONER

## 2022-09-02 NOTE — PROGRESS NOTES
"Subjective:       Patient ID: Cheryl Mayer is a 27 y.o. female.    Vitals:  height is 5' 1" (1.549 m) and weight is 61.2 kg (135 lb). Her temperature is 98.2 °F (36.8 °C). Her blood pressure is 111/75 and her pulse is 73. Her respiration is 18 and oxygen saturation is 98%.     Chief Complaint: Hand Pain (left)    Patient think that one her bones in her left wrist came out of place three weeks ago  provider note below:  This is a 27 y.o. female who presents today with a chief complaint of left wrist pain that started 3 weeks ago, denies any trauma fall or injury, denies numbness or tingling,denies fever, body aches or chills, denies cough, wheezing or shortness of breath, denies nausea, vomiting, diarrhea or abdominal pain, denies chest pain or dizziness positional lightheadedness, denies sore throat or trouble swallowing, denies loss of taste or smell, or any other symptoms  Patient requesting x-ray          Hand Pain   The incident occurred more than 1 week ago. The pain is present in the left wrist. The quality of the pain is described as shooting (shooting from wrist to elbow). She has tried acetaminophen for the symptoms. The treatment provided no relief.   Musculoskeletal:  Positive for pain and joint pain.   Skin:  Negative for erythema.     Objective:      Physical Exam   Constitutional: She is oriented to person, place, and time. She appears well-developed.   HENT:   Head: Normocephalic and atraumatic. Head is without abrasion, without contusion and without laceration.   Ears:   Right Ear: External ear normal.   Left Ear: External ear normal.   Nose: Nose normal.   Mouth/Throat: Oropharynx is clear and moist and mucous membranes are normal.   Eyes: Conjunctivae, EOM and lids are normal. Pupils are equal, round, and reactive to light.   Neck: Trachea normal and phonation normal. Neck supple.   Cardiovascular: Normal rate, regular rhythm and normal heart sounds.   Pulmonary/Chest: Effort normal and breath " sounds normal. No stridor. No respiratory distress.   Musculoskeletal: Normal range of motion.         General: Normal range of motion.      Left wrist: Normal.      Comments: Cap refill 2 seconds, left radial pulse 2+, sensation intact  Positive Finkelstein maneuver, ROM intact of left wrist       Neurological: She is alert and oriented to person, place, and time.   Skin: Skin is warm, dry, intact and no rash. Capillary refill takes less than 2 seconds. No abrasion, No burn, No bruising, No erythema and No ecchymosis   Psychiatric: Her speech is normal and behavior is normal. Judgment and thought content normal.   Nursing note and vitals reviewed.    X-Ray Wrist Complete Right    Result Date: 9/2/2022  EXAMINATION: THREE VIEWS OF THE RIGHT WRIST CLINICAL HISTORY: Pain. TECHNIQUE: AP, oblique, and lateral view of the right wrist COMPARISON: None. FINDINGS: Three views of the right wrist demonstrate no acute fracture or dislocation.     No acute bony abnormality detected. Electronically signed by: Izabel Levine Date:    09/02/2022 Time:    18:45         Patient in no acute distress.  Vitals reassuring.  Discussed results/diagnosis/plan in depth with patient in clinic. Strict precautions given to patient to monitor for worsening signs and symptoms. Advised to follow up with primary.All questions answered. Strict ER precautions given. If your symptoms worsens or fail to improve you should go to the Emergency Room. Discharge and follow-up instructions given verbally/printed. Discharge and follow-up instructions discussed with the patient who expressed understanding and willingness to comply with my recommendations.Patient voiced understanding and in agreement with current treatment plan.     Please be advised this text was dictated with [employer] software and may contain errors due to translation.    Assessment:       1. De Quervain's tenosynovitis, left          Plan:         De Quervain's tenosynovitis, left  -      X-Ray Wrist Complete Right; Future; Expected date: 09/02/2022  -     SPLINT FOR HOME USE         Medical Decision Making:   Clinical Tests:   Radiological Study: Ordered and Reviewed  Urgent Care Management:  Patient in no acute distress.  Vitals reassuring.  On exam, patient is nontoxic appearing and afebrile.  Physical examination consistent with positive Finkelstein maneuver.  Range of motion intact of left wrist.  X-ray negative for fracture.  Thumb spica splint given in clinic.  Over-the-counter medication discussed with patient detail.  I reiterated the importance of further evaluation follow-up with specialist if no improvement symptoms.Patient voiced understanding and in agreement with current treatment plan.           Patient Instructions   If your condition worsens or fails to improve we recommend that you receive another evaluation at the ER immediately or contact your PCP to discuss your concerns or return here. You must understand that you've received an urgent care treatment only and that you may be released before all your medical problems are known or treated. You the patient will arrange for followup care as instructed.    If you were prescribed antibiotics, please take them to completion.  If you were prescribed a narcotic medication, do not drive or operate heavy equipment or machinery while taking these medications.  Please follow up with your primary care doctor or specialist as needed.  If you  smoke, please stop smoking.

## 2022-12-01 ENCOUNTER — TELEPHONE (OUTPATIENT)
Dept: OBSTETRICS AND GYNECOLOGY | Facility: CLINIC | Age: 27
End: 2022-12-01
Payer: MEDICAID

## 2022-12-01 DIAGNOSIS — Z86.69 HISTORY OF MIGRAINE HEADACHES: Primary | ICD-10-CM

## 2022-12-01 NOTE — TELEPHONE ENCOUNTER
----- Message from Nathalia Collazo sent at 11/30/2022  5:01 PM CST -----  Type:  Patient Requesting Referral    Who Called:Cheryl  Does the patient already have the specialty appointment scheduled?:No  If yes, what is the date of that appointment?:  Referral to What Specialty:Neurology  Reason for Referral:Headaches/blurry vision  Does the patient want the referral with a specific physician?:No  [facility]  Patient Requesting a Response?:Yes  Would the patient rather a call back or a response via [facility]? Call back  Best Call Back Number:[phone]  Additional Information:

## 2022-12-02 ENCOUNTER — TELEPHONE (OUTPATIENT)
Dept: OBSTETRICS AND GYNECOLOGY | Facility: CLINIC | Age: 27
End: 2022-12-02
Payer: MEDICAID

## 2022-12-08 ENCOUNTER — PATIENT MESSAGE (OUTPATIENT)
Dept: NEUROLOGY | Facility: CLINIC | Age: 27
End: 2022-12-08
Payer: MEDICAID

## 2023-03-22 ENCOUNTER — OFFICE VISIT (OUTPATIENT)
Dept: URGENT CARE | Facility: CLINIC | Age: 28
End: 2023-03-22
Payer: MEDICAID

## 2023-03-22 VITALS
HEIGHT: 61 IN | BODY MASS INDEX: 24.55 KG/M2 | TEMPERATURE: 99 F | SYSTOLIC BLOOD PRESSURE: 102 MMHG | OXYGEN SATURATION: 98 % | RESPIRATION RATE: 18 BRPM | HEART RATE: 79 BPM | DIASTOLIC BLOOD PRESSURE: 69 MMHG | WEIGHT: 130 LBS

## 2023-03-22 DIAGNOSIS — R42 DIZZINESS: ICD-10-CM

## 2023-03-22 DIAGNOSIS — H53.8 BLURRED VISION: ICD-10-CM

## 2023-03-22 DIAGNOSIS — R51.9 NONINTRACTABLE HEADACHE, UNSPECIFIED CHRONICITY PATTERN, UNSPECIFIED HEADACHE TYPE: Primary | ICD-10-CM

## 2023-03-22 LAB
CTP QC/QA: YES
GLUCOSE SERPL-MCNC: 105 MG/DL (ref 70–110)
HGB, POC: 15 G/DL (ref 12–15)
SARS-COV-2 AG RESP QL IA.RAPID: NEGATIVE

## 2023-03-22 PROCEDURE — 82962 POCT GLUCOSE, HAND-HELD DEVICE: ICD-10-PCS | Mod: S$GLB,,, | Performed by: PHYSICIAN ASSISTANT

## 2023-03-22 PROCEDURE — 99214 PR OFFICE/OUTPT VISIT, EST, LEVL IV, 30-39 MIN: ICD-10-PCS | Mod: S$GLB,,, | Performed by: PHYSICIAN ASSISTANT

## 2023-03-22 PROCEDURE — 85018 HEMOGLOBIN: CPT | Mod: QW,S$GLB,, | Performed by: PHYSICIAN ASSISTANT

## 2023-03-22 PROCEDURE — 82962 GLUCOSE BLOOD TEST: CPT | Mod: S$GLB,,, | Performed by: PHYSICIAN ASSISTANT

## 2023-03-22 PROCEDURE — 87811 SARS CORONAVIRUS 2 ANTIGEN POCT, MANUAL READ: ICD-10-PCS | Mod: QW,S$GLB,, | Performed by: PHYSICIAN ASSISTANT

## 2023-03-22 PROCEDURE — 85018 POCT HEMOGLOBIN: ICD-10-PCS | Mod: QW,S$GLB,, | Performed by: PHYSICIAN ASSISTANT

## 2023-03-22 PROCEDURE — 87811 SARS-COV-2 COVID19 W/OPTIC: CPT | Mod: QW,S$GLB,, | Performed by: PHYSICIAN ASSISTANT

## 2023-03-22 PROCEDURE — 99214 OFFICE O/P EST MOD 30 MIN: CPT | Mod: S$GLB,,, | Performed by: PHYSICIAN ASSISTANT

## 2023-03-22 RX ORDER — BUTALBITAL, ACETAMINOPHEN AND CAFFEINE 50; 325; 40 MG/1; MG/1; MG/1
1 TABLET ORAL EVERY 4 HOURS PRN
Qty: 12 TABLET | Refills: 0 | Status: SHIPPED | OUTPATIENT
Start: 2023-03-22

## 2023-03-22 RX ORDER — IBUPROFEN 200 MG
600 TABLET ORAL
Status: COMPLETED | OUTPATIENT
Start: 2023-03-22 | End: 2023-03-22

## 2023-03-22 RX ADMIN — Medication 600 MG: at 07:03

## 2023-03-22 NOTE — PROGRESS NOTES
"Subjective:       Patient ID: Cheryl Mayer is a 28 y.o. female.    Vitals:  height is 5' 1" (1.549 m) and weight is 59 kg (130 lb). Her temperature is 98.6 °F (37 °C). Her blood pressure is 102/69 and her pulse is 79. Her respiration is 18 and oxygen saturation is 98%.     Chief Complaint: Headache    28 yr female present with chief complaint of headache. Pt states that about 1 hour prior to arrival she began experiencing sensation of blurred vision, abnormal vision of both eyes like she is seeing stars, making her feel dizzy, then the headache came on. The vision distortion resolved. Headache persists. Headache is frontal and biparietal and bitemporal at times. Denies any head trauma. Pt states that her only current complaint/symptom at this time is headache. Last meal was around noon. No hx anemia. No nuchal rigidity or altered mental status. No focal neurological complaint. Pt states that this has happened before in the past. She requested referral to neurology for similar headaches in the past and is in process of setting this up.     Headache   This is a new problem. The current episode started today. The problem occurs constantly. The problem has been gradually worsening. The pain is located in the Frontal region. The pain does not radiate. The pain quality is similar to prior headaches. The quality of the pain is described as squeezing. The pain is at a severity of 9/10. The pain is moderate. Associated symptoms include blurred vision (preceding the headache, resolved) and dizziness. Pertinent negatives include no abdominal pain, coughing, eye pain, eye redness, fever, loss of balance, nausea, neck pain, numbness, photophobia, seizures, sore throat, tinnitus, vomiting or weakness. She has tried nothing for the symptoms. The treatment provided no relief. There is no history of hypertension, migraine headaches or migraines in the family.     Constitution: Negative for chills, sweating, fatigue and fever. "   HENT:  Negative for tinnitus, congestion and sore throat.    Neck: Negative for neck pain and neck stiffness.   Cardiovascular:  Negative for chest pain, leg swelling and palpitations.   Eyes:  Positive for blurred vision (preceding the headache, resolved). Negative for eye itching, eye pain, eye redness, photophobia, vision loss, double vision and eyelid swelling.   Respiratory:  Negative for cough, sputum production and shortness of breath.    Gastrointestinal:  Negative for abdominal pain, nausea, vomiting and diarrhea.   Genitourinary:  Negative for dysuria, frequency and urgency.   Musculoskeletal:  Negative for pain and joint swelling.   Skin:  Negative for color change and rash.   Neurological:  Positive for dizziness and headaches. Negative for history of vertigo, light-headedness, passing out, facial drooping, speech difficulty, coordination disturbances, loss of balance, history of migraines, disorientation, altered mental status, loss of consciousness, numbness, tingling, seizures and tremors.   Psychiatric/Behavioral:  Negative for altered mental status and disorientation.      Objective:      Physical Exam   Constitutional: She is oriented to person, place, and time. She appears well-developed.  Non-toxic appearance. She does not appear ill. No distress.   HENT:   Head: Normocephalic and atraumatic.   Ears:   Right Ear: Hearing, tympanic membrane, external ear and ear canal normal.   Left Ear: Hearing, tympanic membrane, external ear and ear canal normal.   Nose: Nose normal. No mucosal edema, rhinorrhea or nasal deformity. No epistaxis. Right sinus exhibits no maxillary sinus tenderness and no frontal sinus tenderness. Left sinus exhibits no maxillary sinus tenderness and no frontal sinus tenderness.   Mouth/Throat: Uvula is midline, oropharynx is clear and moist and mucous membranes are normal. No trismus in the jaw. Normal dentition. No uvula swelling. No posterior oropharyngeal erythema.   Eyes:  Conjunctivae, EOM and lids are normal. Pupils are equal, round, and reactive to light. No scleral icterus.   Neck: Trachea normal and phonation normal. Neck supple. No neck rigidity present. No decreased range of motion present. No pain with movement present. No spinous process tenderness present. No muscular tenderness present.   Cardiovascular: Normal rate, regular rhythm, normal heart sounds and normal pulses.   Pulmonary/Chest: Effort normal and breath sounds normal. No accessory muscle usage or stridor. No tachypnea and no bradypnea. No respiratory distress. She has no decreased breath sounds. She has no wheezes. She has no rhonchi. She has no rales.   Abdominal: Normal appearance and bowel sounds are normal. She exhibits no distension. Soft. There is no abdominal tenderness.   Musculoskeletal: Normal range of motion.         General: No deformity. Normal range of motion.      Right lower leg: No edema.      Left lower leg: No edema.   Neurological: no focal deficit. She is alert and oriented to person, place, and time. She has normal motor skills and normal sensation. She displays no weakness, no atrophy, facial symmetry and no dysarthria. No cranial nerve deficit or sensory deficit. She exhibits normal muscle tone. She has a normal Finger-Nose-Finger Test. Coordination: Romberg sign negative and Rapid alternating movements normal. She displays no seizure activity. Gait and coordination normal. Coordination normal. GCS eye subscore is 4. GCS verbal subscore is 5. GCS motor subscore is 6.      Comments: Alert, oriented x 3. EOMI, PERRLA. Cranial nerves intact: facial expressions (smile, raising eyebrows, shutting eyes, pursed lips) symmetric. Shoulder shrug str.5/5 bilaterally. Jaw is midline without deviation. Tongue protrudes at midline without fasciculations.  Uvula rises at midline. Sensation to face in distribution of CN V1, V2, and V3 intact. Sensation to upper and lower extremities intact. Finger to  nose, rapid rhythmic alternating movements are intact and smooth bilaterally. Patient ambulates unassisted without rigidity or ataxia. Romberg negative. Voice quality, comprehension, articulation, coherence assessed as appropriate.   Bilateral shoulders, elbows, wrists, knees exhibit full range of motion and 5/5 strength.   strength 5/5 bilaterally.     Skin: Skin is warm, dry, intact, not diaphoretic and not pale.   Psychiatric: Her speech is normal and behavior is normal. Judgment and thought content normal.   Nursing note and vitals reviewed.          Vision Screening    Right eye Left eye Both eyes   Without correction 20/20 20/20 20/20   With correction          Results for orders placed or performed in visit on 03/22/23   SARS Coronavirus 2 Antigen, POCT Manual Read   Result Value Ref Range    SARS Coronavirus 2 Antigen Negative Negative     Acceptable Yes    POCT Glucose, Hand-Held Device   Result Value Ref Range    POC Glucose 105 70 - 110 MG/DL   POCT hemoglobin   Result Value Ref Range    Hemoglobin 15.0 12.0 - 15.0 g/dL       Assessment:       1. Nonintractable headache, unspecified chronicity pattern, unspecified headache type    2. Blurred vision    3. Dizziness          Plan:       Pt presents with headache, had what sounds similar to  visual aura just prior to onset of headache. Instructed close follow up with neurology for evaluation of such headaches, possibly migraine with aura. The visual symptoms have resolved and she only has persistent headache at this time. Neuro exam normal, no other red flag s/s at this time, vitals ok. Pt well appearing on exam and low risk. Instructed monitor closely, go to ER for any new or worsening symptoms. Otherwise, can follow up closely.   - Discussed ddx, home care, tx options, and given follow up precautions.  I have reviewed the patient's chart to view previous visits, labs, and imaging to assess PMH and look for any trends or previous  treatments.    Nonintractable headache, unspecified chronicity pattern, unspecified headache type  -     SARS Coronavirus 2 Antigen, POCT Manual Read  -     ibuprofen tablet 600 mg  -     butalbital-acetaminophen-caffeine -40 mg (FIORICET, ESGIC) -40 mg per tablet; Take 1 tablet by mouth every 4 (four) hours as needed for Headaches.  Dispense: 12 tablet; Refill: 0    Blurred vision  Comments:  resolved  Orders:  -     POCT Glucose, Hand-Held Device  -     POCT hemoglobin    Dizziness  Comments:  resolved      Patient Instructions   This seems most consistent with migraine type headache with aura. Follow up with neurology as scheduled.  If you develop new or worsening symptoms go to ER    - Rest.    - Drink plenty of fluids.    - Acetaminophen (tylenol) or Ibuprofen (advil,motrin) as directed as needed for fever/pain. Avoid tylenol if you have a history of liver disease. Do not take ibuprofen if you have a history of GI bleeding, kidney disease, or if you take blood thinners.     -Fioricet ( butalbital-acetaminophen- caffeine)- take 1-2 tablets every 4 hr as needed for headache.  Do not exceed more than 6 tablets daily. This medication contains acetaminophen/tylenol- so do not take Fioricet with tylenol and do not exceed 4000 mg acetaminophen/tylenol daily.     - Follow up with your PCP or specialty clinic as directed in the next 1-2 weeks if not improved or as needed.  You can call [phone] to schedule an appointment with the appropriate provider.    - Go to the ER or seek medical attention immediately if you develop new or worsening symptoms.     - You must understand that you have received an Urgent Care treatment only and that you may be released before all of your medical problems are known or treated.   - You, the patient, will arrange for follow up care as instructed.   - If your condition worsens or fails to improve we recommend that you receive another evaluation at the ER immediately or  contact your PCP to discuss your concerns or return here.

## 2023-03-23 NOTE — PATIENT INSTRUCTIONS
This seems most consistent with migraine type headache with aura. Follow up with neurology as scheduled.  If you develop new or worsening symptoms go to ER    - Rest.    - Drink plenty of fluids.    - Acetaminophen (tylenol) or Ibuprofen (advil,motrin) as directed as needed for fever/pain. Avoid tylenol if you have a history of liver disease. Do not take ibuprofen if you have a history of GI bleeding, kidney disease, or if you take blood thinners.     -Fioricet ( butalbital-acetaminophen- caffeine)- take 1-2 tablets every 4 hr as needed for headache.  Do not exceed more than 6 tablets daily. This medication contains acetaminophen/tylenol- so do not take Fioricet with tylenol and do not exceed 4000 mg acetaminophen/tylenol daily.     - Follow up with your PCP or specialty clinic as directed in the next 1-2 weeks if not improved or as needed.  You can call [phone] to schedule an appointment with the appropriate provider.    - Go to the ER or seek medical attention immediately if you develop new or worsening symptoms.     - You must understand that you have received an Urgent Care treatment only and that you may be released before all of your medical problems are known or treated.   - You, the patient, will arrange for follow up care as instructed.   - If your condition worsens or fails to improve we recommend that you receive another evaluation at the ER immediately or contact your PCP to discuss your concerns or return here.

## 2023-05-29 ENCOUNTER — OFFICE VISIT (OUTPATIENT)
Dept: URGENT CARE | Facility: CLINIC | Age: 28
End: 2023-05-29
Payer: MEDICAID

## 2023-05-29 VITALS
SYSTOLIC BLOOD PRESSURE: 99 MMHG | WEIGHT: 130 LBS | TEMPERATURE: 98 F | BODY MASS INDEX: 24.55 KG/M2 | RESPIRATION RATE: 18 BRPM | OXYGEN SATURATION: 98 % | DIASTOLIC BLOOD PRESSURE: 66 MMHG | HEART RATE: 75 BPM | HEIGHT: 61 IN

## 2023-05-29 DIAGNOSIS — Z76.89 ENCOUNTER TO ESTABLISH CARE WITH NEW DOCTOR: ICD-10-CM

## 2023-05-29 DIAGNOSIS — R10.13 EPIGASTRIC PAIN: ICD-10-CM

## 2023-05-29 DIAGNOSIS — K21.9 GASTROESOPHAGEAL REFLUX DISEASE, UNSPECIFIED WHETHER ESOPHAGITIS PRESENT: Primary | ICD-10-CM

## 2023-05-29 PROCEDURE — 93010 ELECTROCARDIOGRAM REPORT: CPT | Mod: S$PBB,,, | Performed by: INTERNAL MEDICINE

## 2023-05-29 PROCEDURE — 93010 EKG 12-LEAD: ICD-10-PCS | Mod: S$PBB,,, | Performed by: INTERNAL MEDICINE

## 2023-05-29 PROCEDURE — 99214 PR OFFICE/OUTPT VISIT, EST, LEVL IV, 30-39 MIN: ICD-10-PCS | Mod: S$GLB,,, | Performed by: NURSE PRACTITIONER

## 2023-05-29 PROCEDURE — 93005 EKG 12-LEAD: ICD-10-PCS | Mod: S$GLB,,, | Performed by: NURSE PRACTITIONER

## 2023-05-29 PROCEDURE — 93005 ELECTROCARDIOGRAM TRACING: CPT | Mod: S$GLB,,, | Performed by: NURSE PRACTITIONER

## 2023-05-29 PROCEDURE — 99214 OFFICE O/P EST MOD 30 MIN: CPT | Mod: S$GLB,,, | Performed by: NURSE PRACTITIONER

## 2023-05-29 RX ORDER — MAG HYDROX/ALUMINUM HYD/SIMETH 200-200-20
30 SUSPENSION, ORAL (FINAL DOSE FORM) ORAL
Status: COMPLETED | OUTPATIENT
Start: 2023-05-29 | End: 2023-05-29

## 2023-05-29 RX ORDER — DICYCLOMINE HYDROCHLORIDE 10 MG/5ML
20 SOLUTION ORAL
Status: COMPLETED | OUTPATIENT
Start: 2023-05-29 | End: 2023-05-29

## 2023-05-29 RX ORDER — SUCRALFATE 1 G/1
1 TABLET ORAL 2 TIMES DAILY
Qty: 10 TABLET | Refills: 0 | Status: SHIPPED | OUTPATIENT
Start: 2023-05-29 | End: 2023-06-03

## 2023-05-29 RX ORDER — PANTOPRAZOLE SODIUM 20 MG/1
20 TABLET, DELAYED RELEASE ORAL DAILY
Qty: 30 TABLET | Refills: 0 | Status: SHIPPED | OUTPATIENT
Start: 2023-05-29 | End: 2023-06-28

## 2023-05-29 RX ORDER — LIDOCAINE HYDROCHLORIDE 20 MG/ML
10 SOLUTION OROPHARYNGEAL
Status: COMPLETED | OUTPATIENT
Start: 2023-05-29 | End: 2023-05-29

## 2023-05-29 RX ADMIN — LIDOCAINE HYDROCHLORIDE 10 ML: 20 SOLUTION OROPHARYNGEAL at 05:05

## 2023-05-29 RX ADMIN — DICYCLOMINE HYDROCHLORIDE 20 MG: 10 SOLUTION ORAL at 05:05

## 2023-05-29 RX ADMIN — Medication 30 ML: at 05:05

## 2023-05-29 NOTE — PATIENT INSTRUCTIONS
Take the carafate tonight   Start the pantoprazole tomorrow morning on an empty stomach. Wait 30 minutes before you eat or drink anything besides water  Establish care with a PCP for follow up. You can call [phone] to schedule an appointment tomorrow.     - You must understand that you have received an Urgent Care treatment only and that you may be released before all of your medical problems are known or treated.   - You, the patient, will arrange for follow up care as instructed.   - If your condition worsens or fails to improve we recommend that you receive another evaluation at the ER immediately or contact your PCP to discuss your concerns or return here.      Go to the ER for severe abdominal pain, nausea/vomiting with inability to tolerate fluids, chest pain, difficulty breathing, blood in your stool or vomit, or any worsening of symptoms

## 2023-05-29 NOTE — PROGRESS NOTES
"Subjective:      Patient ID: Cheryl Mayer is a 28 y.o. female.    Vitals:  height is 5' 1" (1.549 m) and weight is 59 kg (130 lb). Her temperature is 98.2 °F (36.8 °C). Her blood pressure is 99/66 and her pulse is 75. Her respiration is 18 and oxygen saturation is 98%.     Chief Complaint: GI Problem    27 yo female presents with burning epigastric pain that radiates to left chest that started this morning after drinking coffee. Increased pain with eating and lying down. Denies increased pain with exertion. No family history of CAD. Took OTC antacid with no change in symptoms. Expresses concern that "there is something wrong with my heart".    Abdominal Pain  This is a new problem. The current episode started today. The onset quality is gradual. The problem occurs intermittently. The problem has been unchanged. The pain is located in the epigastric region. The pain is moderate. The quality of the pain is burning. The abdominal pain radiates to the chest. Associated symptoms include nausea. Pertinent negatives include no belching, constipation, diarrhea, fever, frequency, hematochezia or vomiting. The pain is aggravated by eating. The pain is relieved by Nothing. She has tried antacids for the symptoms. The treatment provided no relief.     Constitution: Negative for chills, fatigue and fever.   Cardiovascular:  Negative for chest pain, palpitations and sob on exertion.   Respiratory:  Negative for cough and shortness of breath.    Gastrointestinal:  Positive for abdominal pain and nausea. Negative for abdominal bloating, vomiting, constipation, diarrhea, bright red blood in stool and dark colored stools.   Genitourinary:  Negative for frequency.    Objective:     Physical Exam   Constitutional: She is oriented to person, place, and time. She appears well-developed. She is cooperative.  Non-toxic appearance. She does not appear ill. No distress.   HENT:   Head: Normocephalic and atraumatic.   Ears:   Right Ear: " Hearing, tympanic membrane, external ear and ear canal normal.   Left Ear: Hearing, tympanic membrane, external ear and ear canal normal.   Nose: Nose normal. No mucosal edema, rhinorrhea or nasal deformity. No epistaxis. Right sinus exhibits no maxillary sinus tenderness and no frontal sinus tenderness. Left sinus exhibits no maxillary sinus tenderness and no frontal sinus tenderness.   Mouth/Throat: Uvula is midline, oropharynx is clear and moist and mucous membranes are normal. No trismus in the jaw. Normal dentition. No uvula swelling. No posterior oropharyngeal erythema.   Eyes: Conjunctivae and lids are normal. Right eye exhibits no discharge. Left eye exhibits no discharge. No scleral icterus.   Neck: Trachea normal and phonation normal. Neck supple.   Cardiovascular: Normal rate, regular rhythm, normal heart sounds and normal pulses.   No murmur heard.  Pulmonary/Chest: Effort normal and breath sounds normal. No respiratory distress.   Abdominal: Normal appearance and bowel sounds are normal. She exhibits no distension and no mass. Soft. There is abdominal tenderness in the epigastric area. There is no rebound and no guarding.   Musculoskeletal: Normal range of motion.         General: No deformity. Normal range of motion.   Neurological: She is alert and oriented to person, place, and time. She exhibits normal muscle tone. Coordination normal.   Skin: Skin is warm, dry, intact, not diaphoretic and not pale. Capillary refill takes less than 2 seconds.   Psychiatric: Her speech is normal and behavior is normal. Judgment and thought content normal.   Nursing note and vitals reviewed.        Assessment:     1. Gastroesophageal reflux disease, unspecified whether esophagitis present    2. Epigastric pain    3. Encounter to establish care with new doctor        Plan:     Gastroesophageal reflux disease, unspecified whether esophagitis present  -     pantoprazole (PROTONIX) 20 MG tablet; Take 1 tablet (20 mg  total) by mouth once daily.  Dispense: 30 tablet; Refill: 0  -     sucralfate (CARAFATE) 1 gram tablet; Take 1 tablet (1 g total) by mouth 2 (two) times daily. for 5 days  Dispense: 10 tablet; Refill: 0    Epigastric pain  -     aluminum-magnesium hydroxide-simethicone 200-200-20 mg/5 mL suspension 30 mL  -     dicyclomine 10 mg/5 mL syrup 20 mg  -     LIDOcaine HCl 2% oral solution 10 mL  -     IN OFFICE EKG 12-LEAD (to Muse)    Encounter to establish care with new doctor  -     Ambulatory referral/consult to Internal Medicine    Patient with epigastric pain that radiates to chest. Pain is non-exertional, reproducible, and gets worse with eating and lying down. EKG unchanged from previous. Significant symptom improvement following GI cocktail.   Reassured patient that cardiac etiology unlucky due to no family h/o CAD, symptom presentation, assessment, lack of personal risk factors, and good response to GI cocktail.  ER precautions discussed, patient verbalizes understanding. Advised to establish care with PCP for follow up.

## 2023-05-30 ENCOUNTER — HOSPITAL ENCOUNTER (EMERGENCY)
Facility: HOSPITAL | Age: 28
Discharge: HOME | End: 2023-05-30
Attending: EMERGENCY MEDICINE
Payer: MEDICAID

## 2023-05-30 VITALS
TEMPERATURE: 98 F | DIASTOLIC BLOOD PRESSURE: 59 MMHG | HEART RATE: 78 BPM | BODY MASS INDEX: 24.55 KG/M2 | OXYGEN SATURATION: 100 % | HEIGHT: 61 IN | RESPIRATION RATE: 16 BRPM | SYSTOLIC BLOOD PRESSURE: 102 MMHG | WEIGHT: 130 LBS

## 2023-05-30 DIAGNOSIS — R51.9 ACUTE NONINTRACTABLE HEADACHE, UNSPECIFIED HEADACHE TYPE: ICD-10-CM

## 2023-05-30 DIAGNOSIS — R07.9 CHEST PAIN: ICD-10-CM

## 2023-05-30 DIAGNOSIS — R00.2 PALPITATIONS: ICD-10-CM

## 2023-05-30 DIAGNOSIS — R07.89 CHEST WALL PAIN: ICD-10-CM

## 2023-05-30 DIAGNOSIS — R07.89 ATYPICAL CHEST PAIN: Primary | ICD-10-CM

## 2023-05-30 LAB
ALBUMIN SERPL BCP-MCNC: 4.7 G/DL (ref 3.5–5.2)
ALP SERPL-CCNC: 70 U/L (ref 55–135)
ALT SERPL W/O P-5'-P-CCNC: 12 U/L (ref 10–44)
ANION GAP SERPL CALC-SCNC: 11 MMOL/L (ref 8–16)
AST SERPL-CCNC: 17 U/L (ref 10–40)
B-HCG UR QL: NEGATIVE
B-HCG UR QL: NEGATIVE
BASOPHILS # BLD AUTO: 0.03 K/UL (ref 0–0.2)
BASOPHILS NFR BLD: 0.3 % (ref 0–1.9)
BILIRUB SERPL-MCNC: 0.1 MG/DL (ref 0.1–1)
BNP SERPL-MCNC: <10 PG/ML (ref 0–99)
BUN SERPL-MCNC: 19 MG/DL (ref 6–20)
CALCIUM SERPL-MCNC: 9.1 MG/DL (ref 8.7–10.5)
CHLORIDE SERPL-SCNC: 103 MMOL/L (ref 95–110)
CO2 SERPL-SCNC: 24 MMOL/L (ref 23–29)
CREAT SERPL-MCNC: 1 MG/DL (ref 0.5–1.4)
CTP QC/QA: YES
CTP QC/QA: YES
DIFFERENTIAL METHOD: ABNORMAL
EOSINOPHIL # BLD AUTO: 0.1 K/UL (ref 0–0.5)
EOSINOPHIL NFR BLD: 1.4 % (ref 0–8)
ERYTHROCYTE [DISTWIDTH] IN BLOOD BY AUTOMATED COUNT: 12.8 % (ref 11.5–14.5)
EST. GFR  (NO RACE VARIABLE): >60 ML/MIN/1.73 M^2
GLUCOSE SERPL-MCNC: 102 MG/DL (ref 70–110)
HCT VFR BLD AUTO: 41.1 % (ref 37–48.5)
HGB BLD-MCNC: 12.7 G/DL (ref 12–16)
IMM GRANULOCYTES # BLD AUTO: 0.03 K/UL (ref 0–0.04)
IMM GRANULOCYTES NFR BLD AUTO: 0.3 % (ref 0–0.5)
LYMPHOCYTES # BLD AUTO: 2.1 K/UL (ref 1–4.8)
LYMPHOCYTES NFR BLD: 24.8 % (ref 18–48)
MCH RBC QN AUTO: 26.4 PG (ref 27–31)
MCHC RBC AUTO-ENTMCNC: 30.9 G/DL (ref 32–36)
MCV RBC AUTO: 85 FL (ref 82–98)
MONOCYTES # BLD AUTO: 0.6 K/UL (ref 0.3–1)
MONOCYTES NFR BLD: 7.5 % (ref 4–15)
NEUTROPHILS # BLD AUTO: 5.6 K/UL (ref 1.8–7.7)
NEUTROPHILS NFR BLD: 65.7 % (ref 38–73)
NRBC BLD-RTO: 0 /100 WBC
PLATELET # BLD AUTO: 227 K/UL (ref 150–450)
PMV BLD AUTO: 11.6 FL (ref 9.2–12.9)
POTASSIUM SERPL-SCNC: 4 MMOL/L (ref 3.5–5.1)
PROT SERPL-MCNC: 7.9 G/DL (ref 6–8.4)
RBC # BLD AUTO: 4.81 M/UL (ref 4–5.4)
SODIUM SERPL-SCNC: 138 MMOL/L (ref 136–145)
TROPONIN I SERPL DL<=0.01 NG/ML-MCNC: <0.006 NG/ML (ref 0–0.03)
WBC # BLD AUTO: 8.59 K/UL (ref 3.9–12.7)

## 2023-05-30 PROCEDURE — 83880 ASSAY OF NATRIURETIC PEPTIDE: CPT | Performed by: NURSE PRACTITIONER

## 2023-05-30 PROCEDURE — 93010 EKG 12-LEAD: ICD-10-PCS | Mod: ,,, | Performed by: INTERNAL MEDICINE

## 2023-05-30 PROCEDURE — 81025 URINE PREGNANCY TEST: CPT | Performed by: NURSE PRACTITIONER

## 2023-05-30 PROCEDURE — 84484 ASSAY OF TROPONIN QUANT: CPT | Performed by: NURSE PRACTITIONER

## 2023-05-30 PROCEDURE — 80053 COMPREHEN METABOLIC PANEL: CPT | Performed by: NURSE PRACTITIONER

## 2023-05-30 PROCEDURE — 85025 COMPLETE CBC W/AUTO DIFF WBC: CPT | Performed by: NURSE PRACTITIONER

## 2023-05-30 PROCEDURE — 99285 EMERGENCY DEPT VISIT HI MDM: CPT | Mod: 25

## 2023-05-30 PROCEDURE — 63600175 PHARM REV CODE 636 W HCPCS: Performed by: EMERGENCY MEDICINE

## 2023-05-30 PROCEDURE — 93010 ELECTROCARDIOGRAM REPORT: CPT | Mod: ,,, | Performed by: INTERNAL MEDICINE

## 2023-05-30 PROCEDURE — 96374 THER/PROPH/DIAG INJ IV PUSH: CPT

## 2023-05-30 PROCEDURE — 93005 ELECTROCARDIOGRAM TRACING: CPT

## 2023-05-30 RX ORDER — ACETAMINOPHEN 500 MG
1000 TABLET ORAL EVERY 6 HOURS PRN
Qty: 50 TABLET | Refills: 0 | Status: SHIPPED | OUTPATIENT
Start: 2023-05-30 | End: 2023-05-30 | Stop reason: CLARIF

## 2023-05-30 RX ORDER — DEXTROMETHORPHAN HYDROBROMIDE, GUAIFENESIN 5; 100 MG/5ML; MG/5ML
650 LIQUID ORAL EVERY 8 HOURS
Qty: 15 TABLET | Refills: 0 | Status: SHIPPED | OUTPATIENT
Start: 2023-05-30 | End: 2023-06-04

## 2023-05-30 RX ORDER — KETOROLAC TROMETHAMINE 30 MG/ML
15 INJECTION, SOLUTION INTRAMUSCULAR; INTRAVENOUS
Status: COMPLETED | OUTPATIENT
Start: 2023-05-30 | End: 2023-05-30

## 2023-05-30 RX ORDER — DEXTROMETHORPHAN HYDROBROMIDE, GUAIFENESIN 5; 100 MG/5ML; MG/5ML
650 LIQUID ORAL EVERY 8 HOURS
Qty: 15 TABLET | Refills: 0 | Status: SHIPPED | OUTPATIENT
Start: 2023-05-30 | End: 2023-05-30 | Stop reason: SDUPTHER

## 2023-05-30 RX ORDER — IBUPROFEN 600 MG/1
600 TABLET ORAL EVERY 6 HOURS PRN
Qty: 20 TABLET | Refills: 0 | Status: SHIPPED | OUTPATIENT
Start: 2023-05-30 | End: 2023-05-30 | Stop reason: SDUPTHER

## 2023-05-30 RX ORDER — IBUPROFEN 600 MG/1
600 TABLET ORAL EVERY 6 HOURS PRN
Qty: 20 TABLET | Refills: 0 | Status: SHIPPED | OUTPATIENT
Start: 2023-05-30 | End: 2023-06-04

## 2023-05-30 RX ADMIN — KETOROLAC TROMETHAMINE 15 MG: 30 INJECTION INTRAMUSCULAR; INTRAVENOUS at 08:05

## 2023-05-30 NOTE — FIRST PROVIDER EVALUATION
Emergency Department TeleTriage Encounter Note      CHIEF COMPLAINT    Chief Complaint   Patient presents with    Chest Pain     Pain to the left side of the chest that radiates to the left arm and into the back. Shortness off breath and fatigue with exertion. Symptoms started yesterday. Also has a headache. Denies nausea and vomiting.        VITAL SIGNS   Initial Vitals   BP Pulse Resp Temp SpO2   05/30/23 1752 05/30/23 1750 05/30/23 1750 05/30/23 1750 05/30/23 1750   (!) 90/55 84 16 97.6 °F (36.4 °C) 100 %      MAP       --                   ALLERGIES    Review of patient's allergies indicates:   Allergen Reactions    Morphine     Penicillins        PROVIDER TRIAGE NOTE  TeleTriage Note: Cheryl Mayer, a nontoxic/well appearing, 28 y.o. female, presented to the ED with c/o headaches and chest pain on the left that radiates to her arm and back that began yesterday. Fatigue and SOB when walking.     All ED beds are full at present; patient notified of this status.  Patient seen and medically screened by Nurse Practitioner via teletriage. Orders initiated at triage to expedite care.  Patient is stable to return to the waiting room and will be placed in an ED bed when available.  Care will be transferred to an alternate provider when patient has been placed in an Exam Room from the [facility] for physical exam, additional orders, and disposition.  5:55 PM Tisha Solomon DNP, FNP-C        ORDERS  Labs Reviewed   CBC W/ AUTO DIFFERENTIAL   COMPREHENSIVE METABOLIC PANEL   TROPONIN I   TROPONIN I   B-TYPE NATRIURETIC PEPTIDE   POCT URINE PREGNANCY       ED Orders (720h ago, onward)      Start Ordered     Status Ordering Provider    05/30/23 2057 05/30/23 1756  Troponin I #2  Once Timed         Ordered TISHA SOLOMON    05/30/23 1757 05/30/23 1756  Vital signs  Every 15 min         Ordered TISHA SOLOMON    05/30/23 1757 05/30/23 1756  Cardiac Monitoring - Adult  Continuous        Comments: Notify Physician If:     Ordered ROSIE, ROJELIO PHILLIPSJenny    05/30/23 1757 05/30/23 1756  Pulse Oximetry Continuous  Continuous         Ordered ROSIE, ROJELIO M.    05/30/23 1757 05/30/23 1756  Diet NPO  Diet effective now         Ordered ROSIE, ROJELIO M.    05/30/23 1757 05/30/23 1756  Saline lock IV  Once         Ordered ROSIE, ROJELIO M.    05/30/23 1757 05/30/23 1756  EKG 12-lead  Once        Comments: Do not perform if previously done during this visit/ triage    Completed by CODI DE LEÓN on 5/30/2023 at  5:56 PM ROSIE, ROJELIO PHILLIPS.    05/30/23 1757 05/30/23 1756  CBC auto differential  STAT         Ordered ROSIE, ROJELIO OLIVIA    05/30/23 1757 05/30/23 1756  Comprehensive metabolic panel  STAT         Ordered ROSIE, ROJELIO OLIVIA    05/30/23 1757 05/30/23 1756  Troponin I #1  STAT         Ordered ROSIEROJELIO    05/30/23 1757 05/30/23 1756  BNP  STAT         Ordered ROSIE, ROJELIO OLIVIA    05/30/23 1757 05/30/23 1756  POCT urine pregnancy  Once         Ordered ROSIEROJELIO    Unscheduled 05/30/23 1756  X-Ray Chest PA And Lateral  1 time imaging         Ordered ROSIEROJELIO              Virtual Visit Note: The provider triage portion of this emergency department evaluation and documentation was performed via [employer], a HIPAA-compliant telemedicine application, in concert with a tele-presenter in the room. A face to face patient evaluation with one of my colleagues will occur once the patient is placed in an emergency department room.      DISCLAIMER: This note was prepared with [employer] voice recognition transcription software. Garbled syntax, mangled pronouns, and other bizarre constructions may be attributed to that software system.

## 2023-05-31 NOTE — DISCHARGE INSTRUCTIONS
Thank you for choosing [facility]!     Our goal in the Emergency Department is to always provide outstanding medical care. You may receive a survey by mail or e-mail in the next week regarding your experience today. We would greatly appreciate you completing and returning the survey. Your feedback provides us with a way to recognize our staff who provide very good care, and it helps us learn how to improve when your experience was below our aspiration of excellence.      It is important to remember that some problems are difficult to diagnose and may not be found during your first visit. Be sure to follow up with your primary care doctor and review any labs/imaging that was performed during your visit with them. If you do not have a primary care doctor, you may contact the one listed on your discharge paperwork, or you may also call the [facility] Appointment Desk at [phone] to schedule an appointment.     All medications may potentially have side effects and it is impossible to predict which medications may give you side effects. If you feel that you are having a negative effect of any medication you should immediately stop taking them and seek medical attention.  Do not drive or make any important decisions for 24 hours if you have received any pain medications, sedatives or mood altering drugs during your ER visit.    We appreciate you trusting us with your medical care. We will be happy to take care of you for all of your future medical needs. You may return to the ER at any time for any new/concerning symptoms, worsening condition, or failure to improve. We hope you feel better soon.     Sincerely,    Evan Armstrong Jr., MD  Board-Certified Emergency Medicine Physician  [facility]

## 2023-05-31 NOTE — ED PROVIDER NOTES
Emergency Department Encounter  Provider Note    Cheryl Mayer  [id number]  2023    Evaluation:    History:     Chief Complaint   Patient presents with    Chest Pain     Pain to the left side of the chest that radiates to the left arm and into the back. Shortness off breath and fatigue with exertion. Symptoms started yesterday. Also has a headache. Denies nausea and vomiting.        History of Present Illness:  Cheryl Mayer is a 28 y.o. female who has a past medical history of Asthma, H/O: , and Ovarian cyst.    The patient presents to the ED due to L-sided chest pain.   Patient reports symptoms started yesterday while at work. She works as a radiology technician.   She describes a tightness in a focal area to the L anterior chest that worsens with movement, certain positions, and deep breathing.   She states the pain radiates to her L upper back, neck, and into the L side of her head.   She denies any associated fever, cough, N/V/D.   She is concerned about a blood clot. She denies any home medications or birth control use. No prior history of DVT or PE. No AC use.   She has not taken anything for the pain at home.   She is requesting an IV medication for the pain.      #[id number] used for professional medical interpretation.         Past Medical History:   Diagnosis Date    Asthma     H/O:      Ovarian cyst      Past Surgical History:   Procedure Laterality Date     SECTION       Family History   Problem Relation Age of Onset    Breast cancer Mother     Asthma Father     Cancer Neg Hx     Colon cancer Neg Hx     Diabetes Neg Hx     Eclampsia Neg Hx     Hypertension Neg Hx     Miscarriages / Stillbirths Neg Hx     Ovarian cancer Neg Hx      labor Neg Hx     Stroke Neg Hx      Social History     Socioeconomic History    Marital status: Legally    Tobacco Use    Smoking status: Never    Smokeless tobacco: Never   Substance and Sexual Activity    Alcohol use: Never     Drug use: Never    Sexual activity: Yes     Partners: Male     Birth control/protection: None     Review of patient's allergies indicates:   Allergen Reactions    Morphine     Penicillins        Review of Systems   Constitutional:  Negative for fever.   Respiratory:  Positive for shortness of breath. Negative for cough.    Cardiovascular:  Positive for chest pain.   Gastrointestinal:  Negative for abdominal pain, diarrhea, nausea and vomiting.     Physical Exam:     Initial Vitals   BP Pulse Resp Temp SpO2   05/30/23 1752 05/30/23 1750 05/30/23 1750 05/30/23 1750 05/30/23 1750   (!) 90/55 84 16 97.6 °F (36.4 °C) 100 %      MAP       --                Physical Exam    Nursing note and vitals reviewed.  Constitutional: She appears well-developed and well-nourished. She is not diaphoretic. No distress.   Well-appearing, in no distress.    HENT:   Head: Normocephalic and atraumatic.   Mouth/Throat: Oropharynx is clear and moist.   Eyes: EOM are normal. Pupils are equal, round, and reactive to light.   Neck: No tracheal deviation present.   Cardiovascular:  Normal rate, regular rhythm, normal heart sounds and intact distal pulses.           Pulmonary/Chest: Breath sounds normal. No stridor. No respiratory distress. She has no wheezes.   Abdominal: Abdomen is soft. Bowel sounds are normal. She exhibits no distension and no mass. There is no abdominal tenderness.   Musculoskeletal:         General: No edema. Normal range of motion.     Neurological: She is alert and oriented to person, place, and time. She has normal strength. No cranial nerve deficit or sensory deficit.   Skin: Skin is warm and dry. Capillary refill takes less than 2 seconds. No pallor.   Psychiatric: She has a normal mood and affect. Her behavior is normal. Thought content normal.       ED Course:   Procedures    Medical Decision Making:    History Acquisition:   Additional historians utilized:  none    Prior medical records were reviewed:   5/29:   viist for GERD, expressed chest pain at that time  3/22: UC visit for headache  12/2021: UC visit for headache and chest pain  Multiple prior ED visits for chest pain    The patient's list of active medical problems, social history, medications, and allergies as documented has been reviewed.     Differential Diagnoses:   Based on available information and initial assessment, Differential Diagnosis includes, but is not limited to:  ACS/MI, PE, aortic dissection, pneumothorax, cardiac tamponade, pericarditis/myocarditis, pneumonia, infection/abscess, lung mass, trauma/fracture, costochondritis/pleurisy, MSK pain/contusion, GERD, biliary disease, pancreatitis, anemia        EKG:   EKG interpretation by ED attending physician:  NSR, rate 75, no ST changes, no ischemia, normal intervals.  Compared with prior EKG dated 12/2021, grossly stable without significant change.    Repeat EKG interpretation by ED attending physician:  NSR, rate 64, no ST changes, no ischemia, normal intervals.  Compared with prior EKG dated earlier today, grossly stable without significant change.        Labs:     Labs Reviewed   CBC W/ AUTO DIFFERENTIAL - Abnormal; Notable for the following components:       Result Value    MCH 26.4 (*)     MCHC 30.9 (*)     All other components within normal limits   COMPREHENSIVE METABOLIC PANEL   TROPONIN I   B-TYPE NATRIURETIC PEPTIDE   POCT URINE PREGNANCY     Independent review of the labs ordered include:   See ED course    Imaging:     Imaging Results              X-Ray Chest PA And Lateral (Final result)  Result time 05/30/23 18:46:59      Final result by Sage Sanchez DO (05/30/23 18:46:59)                   Impression:      No acute abnormality.      Electronically signed by: Sage Sanchez  Date:    05/30/2023  Time:    18:46               Narrative:    EXAMINATION:  XR CHEST PA AND LATERAL    CLINICAL HISTORY:  Chest pain, unspecified    TECHNIQUE:  PA and lateral views of the chest were  performed.    COMPARISON:  08/08/2021    FINDINGS:  The lungs are well expanded and clear. No focal opacities are seen. The pleural spaces are clear.    The cardiac silhouette is unremarkable.    The visualized osseous structures are unremarkable.                                         Additional Consideration:   Additional testing considered during clinical course: none    Social determinants of health considered during development of treatment plan include: poor access to care    Current co-morbidities considered which impacted clinical decision making: none    Case discussed with additional provider: none    Medications   ketorolac injection 15 mg (15 mg Intravenous Given 5/30/23 2049)        ED Course as of 05/31/23 1353   Tue May 30, 2023   2046 SpO2: 100 % [SS]   2046 Resp: 16 [SS]   2046 Pulse: 84 [SS]   2046 Temp: 97.6 °F (36.4 °C) [SS]   2046 BP(!): 102/58  Vitals reassuring.   [SS]   2046 POCT urine pregnancy  Negative  [SS]   2046 CBC auto differential(!)  Unremarkable  [SS]   2046 Comprehensive metabolic panel  WNL [SS]   2046 BNP  WNL [SS]   2046 Troponin I #1  WNL [SS]   2046 X-Ray Chest PA And Lateral  CXR independently interpreted: no focal infiltrate, effusion, edema, free air, or other acute process.  Agree with radiologist interpretation.    [SS]   2047 Workup unremarkable.  PERC negative, ACS unlikely. Will DC with supportive care. Recommend PCP f/u.  [SS]      ED Course User Index  [SS] Evan Armstrong MD       Medical Decision Making:   Initial Assessment:   27 yo F with atypical chest pain starting yesterday. History of GERD, headaches in the past.  Vitals and exam benign.  Will obtain cardiac evaluation, labs, CXR, treat symptomatically, and reassess.   Independently Interpreted Test(s):   I have ordered and independently interpreted X-rays - see prior notes.  I have ordered and independently interpreted EKG Reading(s) - see prior notes  Clinical Tests:   Lab Tests: Ordered and  Reviewed  Radiological Study: Reviewed and Ordered  Medical Tests: Ordered and Reviewed  ED Management:  After complete evaluation, including thorough history and physical exam, I do not believe the patient has ACS or any major cardiac condition at this time. The patient's symptoms are most consistent with atypical chest pain, as the patient's EKG revealed no evidence of acute ischemia, initial and 3-hour delta troponin were within acceptable limits. HEART score was calculated to be 1, low risk for cardiac origin. PERC negative.   The patient was treated with supportive care  and improved. I feel the patient is stable for D/C from the ED with outpatient risk stratification with their PCP.    On re-evaluation, the patient's status has improved.  After complete ED evaluation, clinical impression is most consistent with atypical chest pain, headache.  PCP follow-up within 2-3 days was recommended.    After taking into careful account the patient's history, physical exam findings, as well as empirical and objective data obtained throughout ED workup, I feel no emergent medical condition has been identified. No further evaluation or admission was felt to be required, and the patient is stable for discharge from the ED. The patient and any additional family present were updated with test results, overall clinical impression, and recommended further plan of care, including discharge instructions as provided and outpatient follow-up for continued evaluation and management as needed. All questions were answered. The patient expressed understanding and agreed with current plan for discharge and follow-up plan of care. Strict ED return precautions were provided, including return/worsening of current symptoms, new symptoms, or any other concerns.        Additional MDM:   Heart Score:    History:          Slightly suspicious.  ECG:             Nonspecific repolarisation disturbance  Age:               Less than 45 years  Risk  factors: no risk factors known  Troponin:       Less than or equal to normal limit  Final Score: 1          Clinical Impression:       ICD-10-CM ICD-9-CM   1. Atypical chest pain  R07.89 786.59   2. Chest pain  R07.9 786.50   3. Chest wall pain  R07.89 786.52   4. Palpitations  R00.2 785.1   5. Acute nonintractable headache, unspecified headache type  R51.9 784.0         Follow-up Information       Follow up With Specialties Details Why Contact Info Additional Information    [facility] Family Medicine Schedule an appointment as soon as possible for a visit   [address], [address]  [facility] [address] [phone] Please park in [address] or [address] and use [address] entrance. Take Medical Office Bldg. elevators.    Your Primary Care Provider  Schedule an appointment as soon as possible for a visit   as soon as able              ED Disposition Condition    Discharge Stable               Evan Armstrong MD  05/31/23 6127

## 2023-06-01 ENCOUNTER — TELEPHONE (OUTPATIENT)
Dept: FAMILY MEDICINE | Facility: CLINIC | Age: 28
End: 2023-06-01
Payer: MEDICAID

## 2023-06-01 NOTE — TELEPHONE ENCOUNTER
----- Message from Sharlene Salas sent at 6/1/2023  2:02 PM CDT -----  Type:  Needs Medical Advice    Who Called: pt  Symptoms (please be specific): pt has a referral in her chart to be  seen for a consult visit      Would the patient rather a call back or a response via [facility]? call  Best Call Back Number: [phone]  Additional Information:

## 2023-06-01 NOTE — TELEPHONE ENCOUNTER
----- Message from Sharlene Salas sent at 6/1/2023  2:02 PM CDT -----  Type:  Needs Medical Advice    Who Called: pt  Symptoms (please be specific): pt has a referral in her chart to be  seen for a consult visit      Would the patient rather a call back or a response via [facility]? call  Best Call Back Number: [phone]  Additional Information:

## 2023-06-01 NOTE — TELEPHONE ENCOUNTER
Informed pt that we are not taking any new medicaid pts, [facility]

## 2024-02-20 ENCOUNTER — TELEPHONE (OUTPATIENT)
Dept: OBSTETRICS AND GYNECOLOGY | Facility: CLINIC | Age: 29
End: 2024-02-20
Payer: COMMERCIAL

## 2024-02-20 NOTE — TELEPHONE ENCOUNTER
----- Message from Charles Beck sent at 2/20/2024  4:12 PM CST -----  Contact: pam  Type:  Patient Call          Who Called: patient         Does the patient know what this is regarding?: Requesting a call back about having a appt scheduled for a concern ; please advise           Would the patient rather a call back or a response via [facility]?call           Best Call Back Number: [phone]             Additional Information:

## 2024-02-21 ENCOUNTER — OFFICE VISIT (OUTPATIENT)
Dept: OBSTETRICS AND GYNECOLOGY | Facility: CLINIC | Age: 29
End: 2024-02-21
Payer: COMMERCIAL

## 2024-02-21 VITALS
SYSTOLIC BLOOD PRESSURE: 100 MMHG | HEIGHT: 61 IN | BODY MASS INDEX: 27.47 KG/M2 | WEIGHT: 145.5 LBS | DIASTOLIC BLOOD PRESSURE: 64 MMHG

## 2024-02-21 DIAGNOSIS — Z11.3 SCREENING FOR STD (SEXUALLY TRANSMITTED DISEASE): ICD-10-CM

## 2024-02-21 DIAGNOSIS — Z72.89 OTHER PROBLEMS RELATED TO LIFESTYLE: ICD-10-CM

## 2024-02-21 DIAGNOSIS — N76.0 ACUTE VAGINITIS: Primary | ICD-10-CM

## 2024-02-21 PROCEDURE — 3078F DIAST BP <80 MM HG: CPT | Mod: CPTII,S$GLB,,

## 2024-02-21 PROCEDURE — 81514 NFCT DS BV&VAGINITIS DNA ALG: CPT

## 2024-02-21 PROCEDURE — 1160F RVW MEDS BY RX/DR IN RCRD: CPT | Mod: CPTII,S$GLB,,

## 2024-02-21 PROCEDURE — 3008F BODY MASS INDEX DOCD: CPT | Mod: CPTII,S$GLB,,

## 2024-02-21 PROCEDURE — 3074F SYST BP LT 130 MM HG: CPT | Mod: CPTII,S$GLB,,

## 2024-02-21 PROCEDURE — 99999 PR PBB SHADOW E&M-EST. PATIENT-LVL III: CPT | Mod: PBBFAC,,,

## 2024-02-21 PROCEDURE — 99214 OFFICE O/P EST MOD 30 MIN: CPT | Mod: S$GLB,,,

## 2024-02-21 PROCEDURE — 87491 CHLMYD TRACH DNA AMP PROBE: CPT

## 2024-02-21 PROCEDURE — 1159F MED LIST DOCD IN RCRD: CPT | Mod: CPTII,S$GLB,,

## 2024-02-21 RX ORDER — METRONIDAZOLE 500 MG/1
500 TABLET ORAL EVERY 12 HOURS
Qty: 14 TABLET | Refills: 0 | Status: SHIPPED | OUTPATIENT
Start: 2024-02-21 | End: 2024-02-29

## 2024-02-21 RX ORDER — FLUCONAZOLE 200 MG/1
200 TABLET ORAL
Qty: 2 TABLET | Refills: 0 | Status: SHIPPED | OUTPATIENT
Start: 2024-02-21

## 2024-02-22 ENCOUNTER — LAB VISIT (OUTPATIENT)
Dept: LAB | Facility: HOSPITAL | Age: 29
End: 2024-02-22
Payer: COMMERCIAL

## 2024-02-22 ENCOUNTER — TELEPHONE (OUTPATIENT)
Dept: OBSTETRICS AND GYNECOLOGY | Facility: CLINIC | Age: 29
End: 2024-02-22
Payer: COMMERCIAL

## 2024-02-22 DIAGNOSIS — Z11.3 SCREENING FOR STD (SEXUALLY TRANSMITTED DISEASE): ICD-10-CM

## 2024-02-22 DIAGNOSIS — Z72.89 OTHER PROBLEMS RELATED TO LIFESTYLE: ICD-10-CM

## 2024-02-22 LAB
BACTERIAL VAGINOSIS DNA: NEGATIVE
C TRACH DNA SPEC QL NAA+PROBE: NOT DETECTED
CANDIDA GLABRATA DNA: NEGATIVE
CANDIDA KRUSEI DNA: NEGATIVE
CANDIDA RRNA VAG QL PROBE: NEGATIVE
HBV SURFACE AG SERPL QL IA: NORMAL
HCV AB SERPL QL IA: NORMAL
HIV 1+2 AB+HIV1 P24 AG SERPL QL IA: NORMAL
N GONORRHOEA DNA SPEC QL NAA+PROBE: NOT DETECTED
T VAGINALIS RRNA GENITAL QL PROBE: NEGATIVE

## 2024-02-22 PROCEDURE — 86592 SYPHILIS TEST NON-TREP QUAL: CPT

## 2024-02-22 PROCEDURE — 87389 HIV-1 AG W/HIV-1&-2 AB AG IA: CPT

## 2024-02-22 PROCEDURE — 36415 COLL VENOUS BLD VENIPUNCTURE: CPT

## 2024-02-22 PROCEDURE — 87340 HEPATITIS B SURFACE AG IA: CPT

## 2024-02-22 PROCEDURE — 86803 HEPATITIS C AB TEST: CPT

## 2024-02-22 NOTE — PROGRESS NOTES
"CC: vaginal irritation    HPI:  Cheryl Mayer is a 28 y.o. female  presents to walk in clinic with complaint of vaginal irritation. Reports green discharge with itching for the past 3-4 months. Reports feeling "shocks" to vagina. She is SA with one partner, does not use condoms. She would like STD swabs and blood work.     Past Medical History:   Diagnosis Date    Asthma     H/O:      Ovarian cyst      Past Surgical History:   Procedure Laterality Date     SECTION       Social History     Tobacco Use    Smoking status: Never    Smokeless tobacco: Never   Substance Use Topics    Alcohol use: Never    Drug use: Never     Family History   Problem Relation Age of Onset    Breast cancer Mother     Asthma Father     Cancer Neg Hx     Colon cancer Neg Hx     Diabetes Neg Hx     Eclampsia Neg Hx     Hypertension Neg Hx     Miscarriages / Stillbirths Neg Hx     Ovarian cancer Neg Hx      labor Neg Hx     Stroke Neg Hx      OB History    Para Term  AB Living   2 1 1 0 1 1   SAB IAB Ectopic Multiple Live Births   1 0 0 0 1      # Outcome Date GA Lbr Mau/2nd Weight Sex Delivery Anes PTL Lv   2 SAB            1 Term     M    KONSTANTIN       /64   Ht 5' 1" (1.549 m)   Wt 66 kg (145 lb 8.1 oz)   LMP 2024   BMI 27.49 kg/m²     ROS:  GENERAL: No fever, chills, fatigability or weight loss.  VULVAR: No pain, no lesions and no itching.  VAGINAL: No relaxation, no abnormal bleeding and no lesions+itching and discharge   ABDOMEN: No abdominal pain. Denies nausea. Denies vomiting. No diarrhea. No constipation  BREAST: Denies pain. No lumps. No discharge.  URINARY: No incontinence, no nocturia, no frequency and no dysuria.  CARDIOVASCULAR: No chest pain. No shortness of breath. No leg cramps.  NEUROLOGICAL: No headaches. No vision changes.    PHYSICAL EXAM:  VULVA: normal appearing vulva with no masses, tenderness or lesions   VAGINA: normal appearing vagina with normal color. Thin " white discharge, no lesions   CERVIX: normal appearing cervix without discharge or lesions   UTERUS: uterus is normal size, shape, consistency and nontender   ADNEXA: normal adnexa in size, nontender and no masses    ASSESSMENT and PLAN:    ICD-10-CM ICD-9-CM    1. Acute vaginitis  N76.0 616.10 VAGINOSIS SCREEN BY DNA PROBE      C. trachomatis/N. gonorrhoeae by AMP DNA [facility]; Cervix      metroNIDAZOLE (FLAGYL) 500 MG tablet      fluconazole (DIFLUCAN) 200 MG Tab      2. Screening for STD (sexually transmitted disease)  Z11.3 V74.5 VAGINOSIS SCREEN BY DNA PROBE      C. trachomatis/N. gonorrhoeae by AMP DNA [facility]; Cervix      RPR      Hepatitis B Surface Antigen      HEPATITIS C ANTIBODY      HIV 1/2 Ag/Ab (4th Gen)      3. Other problems related to lifestyle  Z72.89 V69.8 VAGINOSIS SCREEN BY DNA PROBE      C. trachomatis/N. gonorrhoeae by AMP DNA [facility]; Cervix      RPR      Hepatitis B Surface Antigen      HEPATITIS C ANTIBODY      HIV 1/2 Ag/Ab (4th Gen)        - GC/CT and AFFIRM collected  - STD blood work ordered   - Flagyl sent for suspected BV  - Diflucan for suspected yeast     Patient was counseled today on vaginitis prevention including :  a. avoiding feminine products such as deoderant soaps, body wash, bubble bath, douches, scented toilet paper, deoderant tampons or pads, feminine wipes, chronic pad use, etc.  b. avoiding other vulvovaginal irritants such as long hot baths, humidity, tight, synthetic clothing, chlorine and sitting around in wet bathing suits  c. wearing cotton underwear, avoiding thong underwear and no underwear to bed  d. taking showers instead of baths and use a hair dryer on cool setting afterwards to dry  e. wearing cotton to exercise and shower immediately after exercise and change clothes  f. using polyurethane condoms without spermicide if sexually active and symptoms are triggered by intercourse    FOLLOW UP: PRN lack of improvement.    Janie Simpson, FLORINDA DINERO

## 2024-02-22 NOTE — TELEPHONE ENCOUNTER
----- Message from Charles Beck sent at 2/20/2024  4:08 PM CST -----  Contact: pam  Type:  Patient Call          Who Called: patient         Does the patient know what this is regarding?: Requesting a call back for a appt to be scheduled for a concern ;pt need a  evening appt soon as possible ; please advise           Would the patient rather a call back or a response via [facility]?call           Best Call Back Number: [phone]             Additional Information:

## 2024-02-23 LAB — RPR SER QL: NORMAL

## 2024-03-07 ENCOUNTER — OFFICE VISIT (OUTPATIENT)
Dept: SURGERY | Facility: CLINIC | Age: 29
End: 2024-03-07
Payer: COMMERCIAL

## 2024-03-07 ENCOUNTER — TELEPHONE (OUTPATIENT)
Dept: OBSTETRICS AND GYNECOLOGY | Facility: CLINIC | Age: 29
End: 2024-03-07
Payer: COMMERCIAL

## 2024-03-07 VITALS
BODY MASS INDEX: 27.91 KG/M2 | DIASTOLIC BLOOD PRESSURE: 72 MMHG | HEART RATE: 79 BPM | WEIGHT: 147.69 LBS | SYSTOLIC BLOOD PRESSURE: 106 MMHG

## 2024-03-07 DIAGNOSIS — L29.0 PRURITUS ANI: Primary | ICD-10-CM

## 2024-03-07 DIAGNOSIS — K64.8 INTERNAL HEMORRHOIDS: ICD-10-CM

## 2024-03-07 PROCEDURE — 1159F MED LIST DOCD IN RCRD: CPT | Mod: CPTII,S$GLB,, | Performed by: NURSE PRACTITIONER

## 2024-03-07 PROCEDURE — 99999 PR PBB SHADOW E&M-EST. PATIENT-LVL IV: CPT | Mod: PBBFAC,,, | Performed by: NURSE PRACTITIONER

## 2024-03-07 PROCEDURE — 99204 OFFICE O/P NEW MOD 45 MIN: CPT | Mod: 25,S$GLB,, | Performed by: NURSE PRACTITIONER

## 2024-03-07 PROCEDURE — 3074F SYST BP LT 130 MM HG: CPT | Mod: CPTII,S$GLB,, | Performed by: NURSE PRACTITIONER

## 2024-03-07 PROCEDURE — 3008F BODY MASS INDEX DOCD: CPT | Mod: CPTII,S$GLB,, | Performed by: NURSE PRACTITIONER

## 2024-03-07 PROCEDURE — 1160F RVW MEDS BY RX/DR IN RCRD: CPT | Mod: CPTII,S$GLB,, | Performed by: NURSE PRACTITIONER

## 2024-03-07 PROCEDURE — 46600 DIAGNOSTIC ANOSCOPY SPX: CPT | Mod: S$GLB,,, | Performed by: NURSE PRACTITIONER

## 2024-03-07 PROCEDURE — 3078F DIAST BP <80 MM HG: CPT | Mod: CPTII,S$GLB,, | Performed by: NURSE PRACTITIONER

## 2024-03-07 RX ORDER — CLOBETASOL PROPIONATE 0.5 MG/G
CREAM TOPICAL 2 TIMES DAILY
Qty: 30 G | Refills: 1 | Status: SHIPPED | OUTPATIENT
Start: 2024-03-07

## 2024-03-07 RX ORDER — CLOBETASOL PROPIONATE 0.5 MG/G
CREAM TOPICAL 2 TIMES DAILY
Qty: 30 G | Refills: 1 | Status: SHIPPED | OUTPATIENT
Start: 2024-03-07 | End: 2024-03-07 | Stop reason: SDUPTHER

## 2024-03-07 NOTE — PATIENT INSTRUCTIONS
Clobetasol cream topically 2x/day for 7 days, then 1x/day for 7 days, then every other day for 7 days, then stop.  You can get some itching for 2-3 days after stopping cream.   Try your best to avoid putting anything on the area.   If after 2-3 days you are still itching, you can try Miconazole powder (Zeasorb Athlete's Foot Powder) 2x/day for 2 weeks.

## 2024-03-07 NOTE — TELEPHONE ENCOUNTER
Returned pt call in regards to bill. Informed pt that her visit was coded correctly. Pt verbalized understanding and had no further questions at this time.

## 2024-03-07 NOTE — TELEPHONE ENCOUNTER
----- Message from Shirley Izaguirre sent at 3/7/2024 11:06 AM CST -----  Regarding: Patient Advice                Name of Who is Calling: Cheryl Mayer    Who Left The Message: [phone]      What is the request in detail:        Patient called requesting a call regarding her last Office visit with Janie Simpson NP. Wednesday 02/21/2024.   Patient states this visit was coded incorrectly.; which has generated a bill.   Please give the patient a call back at your earliest convenience and further advise.    Thank you      Reply by [facility]:  NO      Preferred Call Back :  [phone] ([phone])

## 2024-03-07 NOTE — PROGRESS NOTES
CRS Office Visit History and Physical    Referring Md:   Self, Aaarefflorencia  No address on file    SUBJECTIVE:     Chief Complaint: perianal itching    History of Present Illness:  The patient is new patient to this practice.   Course is as follows:  Patient is a 29 y.o. female presents with perianal itching. Felt most predominantly to L anus and perineum.  Symptoms have been present for 4 months. Seen by obgyn np, STI panel negative.  Has tried hydrocortisone and kenalog without improvement.  Associated bleeding: yes, slight brbpr occasionally  Previous anorectal procedures: No  denies straining/prolonged time on toilet with bowel movements.  is not currently taking fiber supplement or stool softener  2 bm/day soft formed stools.  Denies wet wipe use  Blood thinners: No    Last Colonoscopy: none  Family history of colorectal cancer or IBD: none.    Review of patient's allergies indicates:   Allergen Reactions    Morphine     Penicillins        Past Medical History:   Diagnosis Date    Asthma     H/O:      Ovarian cyst      Past Surgical History:   Procedure Laterality Date     SECTION       Family History   Problem Relation Age of Onset    Breast cancer Mother     Asthma Father     Cancer Neg Hx     Colon cancer Neg Hx     Diabetes Neg Hx     Eclampsia Neg Hx     Hypertension Neg Hx     Miscarriages / Stillbirths Neg Hx     Ovarian cancer Neg Hx      labor Neg Hx     Stroke Neg Hx      Social History     Tobacco Use    Smoking status: Never    Smokeless tobacco: Never   Substance Use Topics    Alcohol use: Never    Drug use: Never        Review of Systems:  Review of Systems   Skin:  Positive for itching.       OBJECTIVE:     Vital Signs (Most Recent)  Blood Pressure 106/72   Pulse 79   Weight 67 kg (147 lb 11.3 oz)   Last Menstrual Period 2024   Body Mass Index 27.91 kg/m²     Physical Exam:  General: Patient Refused female in no distress   Neuro: Alert and oriented to person,  place, and time.  Moves all extremities.     HEENT: No icterus.  Trachea midline  Respiratory: Respirations are even and unlabored, no cough or audible wheezing  Skin: Warm dry and intact, No visible rashes, no jaundice    Labs reviewed today:  Lab Results   Component Value Date    WBC 8.59 05/30/2023    HGB 12.7 05/30/2023    HCT 41.1 05/30/2023     05/30/2023    ALT 12 05/30/2023    AST 17 05/30/2023     05/30/2023    K 4.0 05/30/2023     05/30/2023    CREATININE 1.0 05/30/2023    BUN 19 05/30/2023    CO2 24 05/30/2023       Imaging reviewed today:  none    Endoscopy reviewed today:  none    Anorectal Exam:    Anal Skin: Normal  - 2 1cm areas of erythema noted to perineum.   - no lesions or open sores noted.    Digital Rectal Exam:  Resting Tone normal  Mass none  Tenderness  absent    Anoscopy:  Verbal consent was obtained.   Clear plastic anoscope inserted.    Hemorrhoids  present  Stigmata of bleeding  present LL  Stigmata of prolapsed  absent  Distal rectal mucosa normal      ASSESSMENT/PLAN:     Diagnoses and all orders for this visit:    Pruritus ani  -     Ambulatory referral/consult to Dermatology; Future  -     clobetasoL (TEMOVATE) 0.05 % cream; Apply topically 2 (two) times daily.        The patient was instructed to:  Tapered clobetasol over 3 weeks.  If no improvement, 2 weeks bid miconazole powder.  If still no improvement, f/u with dermatology.  Rectal hydrocortisone bid for internal hemorrhoids    SERGEI Mohamud  Colon and Rectal Surgery

## 2024-04-08 ENCOUNTER — OFFICE VISIT (OUTPATIENT)
Dept: OBSTETRICS AND GYNECOLOGY | Facility: CLINIC | Age: 29
End: 2024-04-08
Payer: COMMERCIAL

## 2024-04-08 VITALS
SYSTOLIC BLOOD PRESSURE: 101 MMHG | BODY MASS INDEX: 28.33 KG/M2 | WEIGHT: 149.94 LBS | DIASTOLIC BLOOD PRESSURE: 66 MMHG

## 2024-04-08 DIAGNOSIS — N90.89 VULVAR IRRITATION: Primary | ICD-10-CM

## 2024-04-08 DIAGNOSIS — R30.0 DYSURIA: ICD-10-CM

## 2024-04-08 DIAGNOSIS — L29.2 VULVAR ITCHING: ICD-10-CM

## 2024-04-08 DIAGNOSIS — R10.2 PELVIC PAIN: ICD-10-CM

## 2024-04-08 PROCEDURE — 81514 NFCT DS BV&VAGINITIS DNA ALG: CPT | Performed by: NURSE PRACTITIONER

## 2024-04-08 PROCEDURE — 1160F RVW MEDS BY RX/DR IN RCRD: CPT | Mod: CPTII,S$GLB,, | Performed by: NURSE PRACTITIONER

## 2024-04-08 PROCEDURE — 81025 URINE PREGNANCY TEST: CPT | Mod: S$GLB,,, | Performed by: NURSE PRACTITIONER

## 2024-04-08 PROCEDURE — 81003 URINALYSIS AUTO W/O SCOPE: CPT | Mod: QW,S$GLB,, | Performed by: NURSE PRACTITIONER

## 2024-04-08 PROCEDURE — 99214 OFFICE O/P EST MOD 30 MIN: CPT | Mod: S$GLB,,, | Performed by: NURSE PRACTITIONER

## 2024-04-08 PROCEDURE — 1159F MED LIST DOCD IN RCRD: CPT | Mod: CPTII,S$GLB,, | Performed by: NURSE PRACTITIONER

## 2024-04-08 PROCEDURE — 87798 DETECT AGENT NOS DNA AMP: CPT | Performed by: NURSE PRACTITIONER

## 2024-04-08 PROCEDURE — 3078F DIAST BP <80 MM HG: CPT | Mod: CPTII,S$GLB,, | Performed by: NURSE PRACTITIONER

## 2024-04-08 PROCEDURE — 3008F BODY MASS INDEX DOCD: CPT | Mod: CPTII,S$GLB,, | Performed by: NURSE PRACTITIONER

## 2024-04-08 PROCEDURE — 99999 PR PBB SHADOW E&M-EST. PATIENT-LVL III: CPT | Mod: PBBFAC,,, | Performed by: NURSE PRACTITIONER

## 2024-04-08 PROCEDURE — 3074F SYST BP LT 130 MM HG: CPT | Mod: CPTII,S$GLB,, | Performed by: NURSE PRACTITIONER

## 2024-04-08 PROCEDURE — 87491 CHLMYD TRACH DNA AMP PROBE: CPT | Performed by: NURSE PRACTITIONER

## 2024-04-08 RX ORDER — ESTRADIOL 0.1 MG/G
CREAM VAGINAL
Qty: 42.5 G | Refills: 0 | Status: SHIPPED | OUTPATIENT
Start: 2024-04-08

## 2024-04-08 NOTE — PROGRESS NOTES
Chief Complaint: Vulva irritation/itching      HPI:      Cheryl is a 29 y.o.  who presents today due to vulvar itching, irritation ongoing x few months. 1 month ago, abnormal vaginal discharge - green, yellow, gray color. Denies vaginal odor. Symptoms come and go.  She sometimes feels pain when she urinates. Also notices itching at urethra. Not all the time.   Also c/o pelvic pain for 3 days. Sharp pain.   She has tried an over the counter antifungal. She has also tried hydrocortisone, triamcinolone and clobetasol cream. She doesn't feel like any of the creams have helped, if anything cause symptoms to worsen.   She denies using any new products or products with fragrance. She does notice when she wipes with certain toilet paper it does cause irritation specifically around her rectum.    GC/CT/Trich/BV/Yeast swab negative in 2024.     Menses are regular. Patient's last menstrual period was 2024.     Cheryl is currently sexually active with a single male partner. She is currently using no method for contraception. She is considering pregnancy.   Sometimes notices vaginal dryness when she is sexually active. Will sometimes use lubricant with relief.    Previous Pap: NILM, HPV negative (2023). Denies history of abnormal pap tests.     Physical Exam:      PHYSICAL EXAM:  /66   Wt 68 kg (149 lb 14.6 oz)   LMP 2024   BMI 28.33 kg/m²   Body mass index is 28.33 kg/m².     APPEARANCE: Well nourished, well developed, in no acute distress.  PELVIC:  External genitalia and urethra within normal limits. Mild erythema noted on perineum. Vagina without lesions, with discharge - thick, white discharge noted, without erythema, without ulcers.  Cervix without cervical motion tenderness, non-friable. Uterus: normal size, mobile, non-tender.  No adnexal masses or tenderness palpated.    Assessment/Plan:     Vulvar irritation  -     C. trachomatis/N. gonorrhoeae by AMP DNA [facility]; Cervicovaginal  -      Ureaplasma PCR Vagina; Future; Expected date: 04/08/2024  -     Vaginosis Screen by DNA Probe  -     estradioL (ESTRACE) 0.01 % (0.1 mg/gram) vaginal cream; Apply a pea size amount to vulva and perineum every night for 2 weeks, then twice weekly  Dispense: 42.5 g; Refill: 0    Vulvar itching  -     C. trachomatis/N. gonorrhoeae by AMP DNA [facility]; Cervicovaginal  -     Ureaplasma PCR Vagina; Future; Expected date: 04/08/2024  -     Vaginosis Screen by DNA Probe  -     estradioL (ESTRACE) 0.01 % (0.1 mg/gram) vaginal cream; Apply a pea size amount to vulva and perineum every night for 2 weeks, then twice weekly  Dispense: 42.5 g; Refill: 0    Pelvic pain  -     POCT Urine Pregnancy  -     US Pelvis Comp with Transvag NON-OB (xpd; Future; Expected date: 04/08/2024    Dysuria  -     POCT URINALYSIS      PLAN:    UA wnl. UPT negative. Pelvic exam wnl except mild erythema noted on perineum.   BV/Yeast/Trich/GC/CT culture obtained for further evaluation. Will also screen for ureaplasma due to persistent vaginitis like symptoms. Swab needed to screen for mycoplasma not currently available. Will treat empirically with estrogen cream to vulva and perineum seeing that she has already failed topical antifungal and steroid creams. Will have patient follow-up in 2-4 weeks and if her symptoms persist, will consider vulvar biopsy at that time.   Pelvic ultrasound ordered for further evaluation of acute pelvic pain.     Follow up in about 4 weeks (around 5/6/2024).

## 2024-04-09 LAB
B-HCG UR QL: NEGATIVE
BACTERIAL VAGINOSIS DNA: NEGATIVE
BILIRUB SERPL-MCNC: NORMAL MG/DL
BLOOD, POC UA: NORMAL
C TRACH DNA SPEC QL NAA+PROBE: NOT DETECTED
CANDIDA GLABRATA DNA: NEGATIVE
CANDIDA KRUSEI DNA: NEGATIVE
CANDIDA RRNA VAG QL PROBE: NEGATIVE
CTP QC/QA: YES
GLUCOSE UR QL STRIP: NORMAL
KETONES UR QL STRIP: NORMAL
LEUKOCYTE ESTERASE URINE, POC: NORMAL
N GONORRHOEA DNA SPEC QL NAA+PROBE: NOT DETECTED
NITRITE, POC UA: NORMAL
PH, POC UA: 5
PROTEIN, POC: NORMAL
SPECIFIC GRAVITY, POC UA: 1.02
T VAGINALIS RRNA GENITAL QL PROBE: NEGATIVE
UROBILINOGEN, POC UA: NORMAL

## 2024-04-10 ENCOUNTER — OFFICE VISIT (OUTPATIENT)
Dept: SURGERY | Facility: CLINIC | Age: 29
End: 2024-04-10
Payer: COMMERCIAL

## 2024-04-10 VITALS
BODY MASS INDEX: 28.42 KG/M2 | HEIGHT: 61 IN | SYSTOLIC BLOOD PRESSURE: 106 MMHG | HEART RATE: 75 BPM | DIASTOLIC BLOOD PRESSURE: 75 MMHG | WEIGHT: 150.56 LBS

## 2024-04-10 DIAGNOSIS — K62.5 BRBPR (BRIGHT RED BLOOD PER RECTUM): Primary | ICD-10-CM

## 2024-04-10 DIAGNOSIS — K60.2 ANAL FISSURE: ICD-10-CM

## 2024-04-10 DIAGNOSIS — L29.0 PRURITUS ANI: ICD-10-CM

## 2024-04-10 PROCEDURE — 1160F RVW MEDS BY RX/DR IN RCRD: CPT | Mod: CPTII,S$GLB,, | Performed by: NURSE PRACTITIONER

## 2024-04-10 PROCEDURE — 3078F DIAST BP <80 MM HG: CPT | Mod: CPTII,S$GLB,, | Performed by: NURSE PRACTITIONER

## 2024-04-10 PROCEDURE — 3008F BODY MASS INDEX DOCD: CPT | Mod: CPTII,S$GLB,, | Performed by: NURSE PRACTITIONER

## 2024-04-10 PROCEDURE — 99214 OFFICE O/P EST MOD 30 MIN: CPT | Mod: S$GLB,,, | Performed by: NURSE PRACTITIONER

## 2024-04-10 PROCEDURE — 3074F SYST BP LT 130 MM HG: CPT | Mod: CPTII,S$GLB,, | Performed by: NURSE PRACTITIONER

## 2024-04-10 PROCEDURE — 99999 PR PBB SHADOW E&M-EST. PATIENT-LVL IV: CPT | Mod: PBBFAC,,, | Performed by: NURSE PRACTITIONER

## 2024-04-10 PROCEDURE — 1159F MED LIST DOCD IN RCRD: CPT | Mod: CPTII,S$GLB,, | Performed by: NURSE PRACTITIONER

## 2024-04-10 NOTE — PATIENT INSTRUCTIONS
ITCHING  Consider a Bidet to get clean instead of wiping so much with toilet paper  Always keep area clean and dry  Use the calmoseptine cream around anus and perineum to keep area protected    ANAL FISSURE   diltiazem cream from [facility] on [address] in [place]  Warm soak in bath tub twice a day

## 2024-04-10 NOTE — PROGRESS NOTES
"CRS Office Visit History and Physical    Referring Md:   No referring provider defined for this encounter.    SUBJECTIVE:     Chief Complaint: BRBPR    History of Present Illness:  The patient is est patient to this practice. New to me. Seen a month ago for anal itching and brbpr. Itching is worse but has appt w Derm in 5 days  She is concerned with the BRBPR  Course is as follows:  Patient is a 29 y.o. female presents with BRBPR.  No help, she thinks it has it gotten worse  The rectal bleeding is worse as well. She reports the anus has bleeding, sometimes sees a break at the anus toward the back  When she wipes, theres a lot discomfort.   But the act of having a BM is not painful.   She reports she notices blood in the poop  Has a BM 2-3 per day, soft and easy to pass  No mucous in the stool      Review of patient's allergies indicates:   Allergen Reactions    Morphine     Penicillins        Past Medical History:   Diagnosis Date    Asthma     H/O:      Ovarian cyst      Past Surgical History:   Procedure Laterality Date     SECTION       Family History   Problem Relation Age of Onset    Breast cancer Mother     Asthma Father     Cancer Neg Hx     Colon cancer Neg Hx     Diabetes Neg Hx     Eclampsia Neg Hx     Hypertension Neg Hx     Miscarriages / Stillbirths Neg Hx     Ovarian cancer Neg Hx      labor Neg Hx     Stroke Neg Hx      Social History     Tobacco Use    Smoking status: Never    Smokeless tobacco: Never   Substance Use Topics    Alcohol use: Never    Drug use: Never        Review of Systems:  ROS    OBJECTIVE:     Vital Signs (Most Recent)  /75 (BP Location: Left arm, Patient Position: Sitting, BP Method: Medium (Automatic))   Pulse 75   Ht 5' 1" (1.549 m)   Wt 68.3 kg (150 lb 9.2 oz)   LMP 2024   BMI 28.45 kg/m²     Physical Exam:  General: Patient Refused female in no distress   Neuro: Alert and oriented to person, place, and time.  Moves all extremities.   "   HEENT: No icterus.  Trachea midline  Respiratory: Respirations are even and unlabored, no cough or audible wheezing  Skin: Warm dry and intact, No visible rashes, no jaundice    Labs reviewed today:  Lab Results   Component Value Date    WBC 8.59 05/30/2023    HGB 12.7 05/30/2023    HCT 41.1 05/30/2023     05/30/2023    ALT 12 05/30/2023    AST 17 05/30/2023     05/30/2023    K 4.0 05/30/2023     05/30/2023    CREATININE 1.0 05/30/2023    BUN 19 05/30/2023    CO2 24 05/30/2023         Anorectal Exam:  Jeevan allen    Anal Skin: MLP MLA anal fissures  Mild Perineum erythema, no vesicles or wounds    Digital Rectal Exam:  deferred      ASSESSMENT/PLAN:     Diagnoses and all orders for this visit:    BRBPR (bright red blood per rectum)    Anal fissure  -     diltiazem HCl (DILTIAZEM 2% CREAM); Apply topically 3 (three) times daily. Apply topically to anal area.    Pruritus ani        The patient was instructed to:  ITCHING  Consider a Bidet to get clean instead of wiping so much with toilet paper  Always keep area clean and dry  Use the calmoseptine cream around anus and perineum to keep area protected  Follow up with derm since no help w calmoseptine and miconazole powder    ANAL FISSURE   diltiazem cream from [facility] on [facility] in [place], use TID  Warm soak in bath tub twice a day  F/u in 4 weeks    SERGEI Mathur  Colon and Rectal Surgery

## 2024-04-13 LAB
SPECIMEN SOURCE: NORMAL
U PARVUM DNA SPEC QL NAA+PROBE: NEGATIVE
U UREALYTICUM DNA SPEC QL NAA+PROBE: NEGATIVE

## 2024-04-15 ENCOUNTER — OFFICE VISIT (OUTPATIENT)
Dept: DERMATOLOGY | Facility: CLINIC | Age: 29
End: 2024-04-15
Payer: COMMERCIAL

## 2024-04-15 DIAGNOSIS — L30.9 DERMATITIS, UNSPECIFIED: Primary | ICD-10-CM

## 2024-04-15 DIAGNOSIS — L70.0 ACNE VULGARIS: ICD-10-CM

## 2024-04-15 PROCEDURE — 1159F MED LIST DOCD IN RCRD: CPT | Mod: CPTII,S$GLB,, | Performed by: STUDENT IN AN ORGANIZED HEALTH CARE EDUCATION/TRAINING PROGRAM

## 2024-04-15 PROCEDURE — 99999 PR PBB SHADOW E&M-EST. PATIENT-LVL III: CPT | Mod: PBBFAC,,, | Performed by: STUDENT IN AN ORGANIZED HEALTH CARE EDUCATION/TRAINING PROGRAM

## 2024-04-15 PROCEDURE — 1160F RVW MEDS BY RX/DR IN RCRD: CPT | Mod: CPTII,S$GLB,, | Performed by: STUDENT IN AN ORGANIZED HEALTH CARE EDUCATION/TRAINING PROGRAM

## 2024-04-15 PROCEDURE — 99204 OFFICE O/P NEW MOD 45 MIN: CPT | Mod: S$GLB,,, | Performed by: STUDENT IN AN ORGANIZED HEALTH CARE EDUCATION/TRAINING PROGRAM

## 2024-04-15 RX ORDER — KETOCONAZOLE 20 MG/G
CREAM TOPICAL
Qty: 60 G | Refills: 1 | Status: SHIPPED | OUTPATIENT
Start: 2024-04-15

## 2024-04-15 RX ORDER — CLINDAMYCIN AND BENZOYL PEROXIDE 10; 50 MG/G; MG/G
GEL TOPICAL
Qty: 50 G | Refills: 2 | Status: SHIPPED | OUTPATIENT
Start: 2024-04-15

## 2024-04-15 NOTE — PATIENT INSTRUCTIONS
AM:  - Over the counter gentle face cleanser (CeraVe, Cetaphil, Vanicream, etc)    - Start benzaclin gel apply in morning to zones of acne  - Apply sunscreen with SPF 30+ (sunscreen with moisturizer is acceptable if dry, for example Vanicream mineral moisturizing cream SPF 30+ Or CeraVe AM lotion with sunscreen    PM:  - Over the counter gentle face cleanser (CeraVe, Cetaphil, Vanicream, etc)    - Start adapelene gel over the counter. Start topical retinoid every other night and increase to nightly as tolerated. Apply pea-sized amount to dry face at night. Possible side effects of topical retinoid including dryness, peeling/scaling, erythema, pruritus. If side effects occur, decrease frequency of use or use moisturizer before use. Always use moisturizer after use. May have initial worsening of acne and results take 2-3 months to see benefit, so consistent use is important. This medication is contraindicated in pregnancy.  - Apply moisturizing cream after topical retinoid (vanicream)      Recommend bathing in lukewarm water (not scalding hot) no more than 5-10 minutes per day. Recommend fragrance-free detergent (I.e. All Free and Clear) and soap (I.e. Dove sensitive skin bar soap) only to soiled areas of body. Recommend liberal use of moisturizing cream (I.e. CeraVe cream, Cetaphil cream) especially within 5 minutes of bathing.

## 2024-04-15 NOTE — PROGRESS NOTES
Subjective:      Patient ID:  Cheryl Mayer is a 29 y.o. female who presents for   Chief Complaint   Patient presents with    Rash     Groin    Acne     Face and Back     29 y.o. female presents today for a rash.    New patient    1. Rash  Located in perianal region, gluteal fold  Present 4 months, itchy and irritated and sometimes open skin  Has seen colorectal surgery and obgyn and nothing has helped so far, STI panel negative  Prior treatments tried: clobetasoL (TEMOVATE) 0.05 % cream, diltiazem HCl (DILTIAZEM 2% CREAM), estradioL (ESTRACE) 0.01 % (0.1 mg/gram) vaginal cream  SA with 1 partner, does not wear condoms, vaginal intercourse    2. Rash  Bumps on face and back   Itchy, dry, red, peeling, red bumps  Relieving factors/Previous treatments: hydrating cream (no help)              Review of Systems    Objective:   Physical Exam   Skin:   Areas Examined (abnormalities noted in diagram):   Head / Face Inspection Performed  Genitals / Buttocks / Groin Inspection Performed  Back Inspection Performed       Diagram Legend     Erythematous scaling macule/papule c/w actinic keratosis       Vascular papule c/w angioma      Pigmented verrucoid papule/plaque c/w seborrheic keratosis      Yellow umbilicated papule c/w sebaceous hyperplasia      Irregularly shaped tan macule c/w lentigo     1-2 mm smooth white papules consistent with Milia      Movable subcutaneous cyst with punctum c/w epidermal inclusion cyst      Subcutaneous movable cyst c/w pilar cyst      Firm pink to brown papule c/w dermatofibroma      Pedunculated fleshy papule(s) c/w skin tag(s)      Evenly pigmented macule c/w junctional nevus     Mildly variegated pigmented, slightly irregular-bordered macule c/w mildly atypical nevus      Flesh colored to evenly pigmented papule c/w intradermal nevus       Pink pearly papule/plaque c/w basal cell carcinoma      Erythematous hyperkeratotic cursted plaque c/w SCC      Surgical scar with no sign of skin  cancer recurrence      Open and closed comedones      Inflammatory papules and pustules      Verrucoid papule consistent consistent with wart     Erythematous eczematous patches and plaques     Dystrophic onycholytic nail with subungual debris c/w onychomycosis     Umbilicated papule    Erythematous-base heme-crusted tan verrucoid plaque consistent with inflamed seborrheic keratosis     Erythematous Silvery Scaling Plaque c/w Psoriasis     See annotation      Assessment / Plan:        Cheryl was seen today for rash and acne.    Diagnoses and all orders for this visit:    Dermatitis, unspecified  Pt with perianal erythema, no scale, no open skin or erosions, no lesions  Also with gluteal cleft erythema  Ddx irritant/allergic contact dermatitis v less likely LS v HSV v other  Pt feels open skin in perianal region- I do not see open skin today but swab for HSV and bacterial swab performed  Recommend ketoconazole cream bid to AA (has not tried antifungals yet)  Recommend thick layer of vaseline or aquaphor twice daily to affected areas  D/c topical steroid clobetasol for now  Continue to avoid soap or wipes in area- pt already doing this    Acne vulgaris  Mild comedonal > inflammatory acne on face  Status: chronic condition flaring and/or not at treatment goal  AM:  - Over the counter gentle face cleanser (CeraVe, Cetaphil, Vanicream, etc)    - Start benzaclin gel apply in morning to zones of acne  - Apply sunscreen with SPF 30+ (sunscreen with moisturizer is acceptable if dry, for example Vanicream mineral moisturizing cream SPF 30+ Or CeraVe AM lotion with sunscreen    PM:  - Over the counter gentle face cleanser (CeraVe, Cetaphil, Vanicream, etc)    - Start adapelene gel over the counter. Start topical retinoid every other night and increase to nightly as tolerated. Apply pea-sized amount to dry face at night. Possible side effects of topical retinoid including dryness, peeling/scaling, erythema, pruritus. If side  effects occur, decrease frequency of use or use moisturizer before use. Always use moisturizer after use. May have initial worsening of acne and results take 2-3 months to see benefit, so consistent use is important. This medication is contraindicated in pregnancy.  - Apply moisturizing cream after topical retinoid (vanicream)    Provided above in AVS    RTC 6 weeks follow up rash

## 2024-05-07 ENCOUNTER — PATIENT MESSAGE (OUTPATIENT)
Dept: DERMATOLOGY | Facility: CLINIC | Age: 29
End: 2024-05-07
Payer: COMMERCIAL

## 2024-05-09 ENCOUNTER — OFFICE VISIT (OUTPATIENT)
Dept: OBSTETRICS AND GYNECOLOGY | Facility: CLINIC | Age: 29
End: 2024-05-09
Payer: COMMERCIAL

## 2024-05-09 VITALS
BODY MASS INDEX: 28.78 KG/M2 | HEART RATE: 75 BPM | WEIGHT: 152.31 LBS | DIASTOLIC BLOOD PRESSURE: 66 MMHG | SYSTOLIC BLOOD PRESSURE: 102 MMHG

## 2024-05-09 DIAGNOSIS — N90.89 VULVAR IRRITATION: Primary | ICD-10-CM

## 2024-05-09 DIAGNOSIS — L29.2 VULVAR ITCHING: ICD-10-CM

## 2024-05-09 DIAGNOSIS — R23.8 SKIN IRRITATION: ICD-10-CM

## 2024-05-09 PROCEDURE — 1159F MED LIST DOCD IN RCRD: CPT | Mod: CPTII,S$GLB,, | Performed by: NURSE PRACTITIONER

## 2024-05-09 PROCEDURE — 3074F SYST BP LT 130 MM HG: CPT | Mod: CPTII,S$GLB,, | Performed by: NURSE PRACTITIONER

## 2024-05-09 PROCEDURE — 3078F DIAST BP <80 MM HG: CPT | Mod: CPTII,S$GLB,, | Performed by: NURSE PRACTITIONER

## 2024-05-09 PROCEDURE — 99999 PR PBB SHADOW E&M-EST. PATIENT-LVL III: CPT | Mod: PBBFAC,,, | Performed by: NURSE PRACTITIONER

## 2024-05-09 PROCEDURE — 99213 OFFICE O/P EST LOW 20 MIN: CPT | Mod: S$GLB,,, | Performed by: NURSE PRACTITIONER

## 2024-05-09 PROCEDURE — 3008F BODY MASS INDEX DOCD: CPT | Mod: CPTII,S$GLB,, | Performed by: NURSE PRACTITIONER

## 2024-05-09 NOTE — PROGRESS NOTES
Chief Complaint: F/u     HPI:      Cheryl is a 29 y.o.  who presents today for follow-up. She was seen on  for a few concerns however today her main concern is as follows:     She noticed about 5 months ago, that she started losing pubic hair. She then started having itching all over where she has pubic hair. Currently on her mons pubis - on the skin it feels like a needle like discomfort. On her vulva - it feels like ants crawling, spasms and sometimes it itches.     She has seen a variety of providers for this concern. She has tried multiple topical treatments (OTC antifungals, vagisil, hydrocortisone, triamcinolone, clobetasol). She felt like the topical steroids made her symptoms worse. I had recommended a topical estrogen to perineum which she tried without relief.     She most recently saw Dermatology who obtained a HSV and bacterial swab - results pending. Dermatology recommended ketoconazole to the affected area as well as a layer of vaseline or aquaphor.   She is currently using ketoconzole and aquaphor for about 3-4 weeks without any relief.       Physical Exam:      PHYSICAL EXAM:  /66   Pulse 75   Wt 69.1 kg (152 lb 5.4 oz)   LMP 2024   BMI 28.78 kg/m²   Body mass index is 28.78 kg/m².     APPEARANCE: Well nourished, well developed, in no acute distress.  PELVIC:  External genitalia and urethra within normal limits. Vagina without lesions, without discharge, without erythema, without ulcers. Perineum no longer erythematous. Mild erythema noted in inguinal   Cervix without cervical motion tenderness, non-friable. Bimanual exam deferred.     Assessment/Plan:     Vulvar irritation    Vulvar itching    Skin irritation      PLAN:    Here today due to persistent irritation and itching on mons pubis and vulva where pubic hair is located.   Has seen colorectal surgery, derm and tried multiple topical treatments without relief.   Exam overall is unremarkable in office today except mild  erythema noted in bilateral inguinal folds.   Although not identified on exam today, will treat empirically for public lice based on patient's symptoms. Discussed OTC treatment with a lice-killing lotion containing 1% permethrin or a mousse containing pyrethrins and piperonyl butoxide can be used to treat pubic (crab) lice and provided her with [facility] handout regarding treatment.   Will have patient follow-up in 1 week and if no relief at that time, will refer patient to vulvar specialist.     Follow up in about 1 week (around 5/16/2024).

## 2024-06-13 ENCOUNTER — TELEPHONE (OUTPATIENT)
Dept: OBSTETRICS AND GYNECOLOGY | Facility: CLINIC | Age: 29
End: 2024-06-13
Payer: COMMERCIAL

## 2024-06-21 ENCOUNTER — OFFICE VISIT (OUTPATIENT)
Dept: URGENT CARE | Facility: CLINIC | Age: 29
End: 2024-06-21
Payer: COMMERCIAL

## 2024-06-21 VITALS
WEIGHT: 152 LBS | HEART RATE: 81 BPM | HEIGHT: 61 IN | TEMPERATURE: 98 F | DIASTOLIC BLOOD PRESSURE: 72 MMHG | RESPIRATION RATE: 14 BRPM | SYSTOLIC BLOOD PRESSURE: 106 MMHG | OXYGEN SATURATION: 99 % | BODY MASS INDEX: 28.7 KG/M2

## 2024-06-21 DIAGNOSIS — S43.402A SPRAIN OF LEFT SHOULDER, UNSPECIFIED SHOULDER SPRAIN TYPE, INITIAL ENCOUNTER: Primary | ICD-10-CM

## 2024-06-21 DIAGNOSIS — Z02.83 ENCOUNTER FOR DRUG SCREENING: ICD-10-CM
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
## 2024-06-21 NOTE — LETTER
[facility] - [place] [address]  Phone: [phone]  Fax: [phone]  [facility] [facility]: [phone]    Pt Name: Cheryl Mayer  Injury Date: 06/21/2024   Employee ID:  Date of First Treatment: 06/21/2024   Company: [employer]      Appointment Time: 06:50 PM Arrived: 1908   Provider: Soni Gibson NP Time Out:1945     Office Treatment: outpatient left shoulder xray, tylenol, ibuprofen as needed for pain, Follow up with occupational health on Monday.   1. Sprain of left shoulder, unspecified shoulder sprain type, initial encounter    2. Work related injury                     Return Appointment: Visit date not found at 6/24/2024

## 2024-06-22 LAB
CTP QC/QA: YES
POC 10 PANEL DRUG SCREEN: NEGATIVE

## 2024-06-22 NOTE — PROGRESS NOTES
Subjective:      Patient ID: Cheryl Mayer is a 29 y.o. female.    Chief Complaint: Workers Comp    29 year old female presents today with a work related injury that occurred on 06/21/2024 around 4:15 PM. 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 States she was pushing the X-ray board under the patient and she felt a pop in her left pop and the pain radiates to her neck. Abnormal ROM, unable to lift arm all the way up. Describes the pain as sharp and pressure. Also states the pain shoots down to her hand/wrist. No previous injuries to the mentioned areas before. Pain scale overall currently 07/10  Provider note begins below:  Pt is xray tech at [employer], today around 1615 she was working and pushing the xray image receptor under a 240 pound patient, assisted by another tech, nurse, 2 doctors. Pt reports she felt a pop to left shoulder when she pushed the image receptor, she says she was having a lot of force to get under the patient. She then told her supervisor. She says the pain radiates from left shoulder to left wrist. She has pain with ROM. Pt is right handed, rates pain 7/10. Pt declined .    Shoulder Pain   The pain is present in the neck, left shoulder, left wrist and left hand. This is a new problem. The current episode started today. There has been no history of extremity trauma. The problem occurs constantly. The problem has been unchanged. The quality of the pain is described as sharp. The pain is at a severity of 7/10. Associated symptoms include a limited range of motion. Pertinent negatives include no fever, headaches, inability to bear weight, itching, joint locking, joint swelling, numbness, stiffness, tingling or visual symptoms. She has tried nothing for the symptoms. There is no history of diabetes, gout, Injuries to  Extremity or migraines.       Constitution: Negative for activity change, appetite change, chills, sweating, fatigue and fever.   Musculoskeletal:  Positive for pain, joint pain and abnormal ROM of joint. Negative for trauma, joint swelling, arthritis, gout, back pain, muscle cramps, muscle ache and history of spine disorder.   Neurological:  Negative for headaches and numbness.     Objective:     Physical Exam  Constitutional:       General: She is not in acute distress.     Appearance: She is not ill-appearing, toxic-appearing or diaphoretic.   HENT:      Head: Normocephalic and atraumatic.      Nose: No congestion.   Cardiovascular:      Rate and Rhythm: Normal rate and regular rhythm.      Pulses: Normal pulses.           Radial pulses are 2+ on the left side.      Heart sounds: Normal heart sounds.   Pulmonary:      Effort: Pulmonary effort is normal.      Breath sounds: Normal breath sounds.   Musculoskeletal:      Left shoulder: No swelling, deformity, effusion, laceration, tenderness, bony tenderness or crepitus. Decreased range of motion (pain with flexion, limited ROM, rotation intact with pain with ROM.). Normal strength. Normal pulse.      Cervical back: Normal range of motion and neck supple. No rigidity or tenderness.   Lymphadenopathy:      Cervical: No cervical adenopathy.   Skin:     General: Skin is warm and dry.      Capillary Refill: Capillary refill takes less than 2 seconds.   Neurological:      Mental Status: She is oriented to person, place, and time.      Sensory: Sensation is intact.      Motor: Motor function is intact.      Coordination: Coordination is intact.          Assessment:      1. Sprain of left shoulder, unspecified shoulder sprain type, initial encounter    2. Work related injury      Plan:     Refer to occupational health, mild limitation in ROM, she will f/u with [facility] health on Monday, xray ordered today    Discussed results/diagnosis/plan with patient in clinic. Strict  precautions given to patient to monitor for worsening signs and symptoms. Advised to follow up with PCP or specialist.    Explained side effects of medications prescribed with patient and informed him/her to discontinue use if he/she has any side effects and to inform UC or PCP if this occurs. All questions answered. Strict ED verses clinic return precautions stressed and given in depth. Advised if symptoms worsens of fail to improve he/she should go to the Emergency Room. Discharge and follow-up instructions given verbally/printed with the patient who expressed understanding and willingness to comply with my recommendations. Patient voiced understanding and in agreement with current treatment plan. Patient exits the exam room in no acute distress. Conversant and engaged during discharge discussion, verbalized understanding.                No follow-ups on file.

## 2024-06-22 NOTE — PATIENT INSTRUCTIONS
Please try Tylenol, ibuprofen, lidocaine patch over-the-counter for pain, schedule your x-ray and follow-up with occupational health on Monday.    General Discharge Instructions   PLEASE READ YOUR DISCHARGE INSTRUCTIONS ENTIRELY AS IT CONTAINS IMPORTANT INFORMATION.  If you were prescribed a narcotic or controlled medication, do not drive or operate heavy equipment or machinery while taking these medications.  If you were prescribed antibiotics, please take them to completion.  You must understand that you've received an Urgent Care treatment only and that you may be released before all your medical problems are known or treated. You, the patient, will arrange for follow up care as instructed.    OVER THE COUNTER RECOMMENDATIONS/SUGGESTIONS.    Make sure to stay well hydrated.    Use Nasal Saline to mechanically move any post nasal drip from your eustachian tube or from the back of your throat.    Use warm salt water gargles to ease your throat pain. Warm salt water gargles as needed for sore throat- 1/2 tsp salt to 1 cup warm water, gargle as desired.    Use an antihistamine such as Claritin, Zyrtec or Allegra to dry you out.    Use pseudoephedrine (behind the counter) to decongest. Pseudoephedrine 30 mg up to 240 mg /day. It can raise your blood pressure and give you palpitations.    Use mucinex (guaifenesin) to break up mucous up to 2400mg/day to loosen any mucous.    The mucinex DM pill has a cough suppressant that can be sedating. It can be used at night to stop the tickle at the back of your throat.    You can use Mucinex D (it has guaifenesin and a high dose of pseudoephedrine) in the mornings to help decongest.    Use Afrin in each nare for no longer than 3 days, as it is addictive. It can also dry out your mucous membranes and cause elevated blood pressure. This is especially useful if you are flying.    Use Flonase 1-2 sprays/nostril per day. It is a local acting steroid nasal spray, if you develop a  bloody nose, stop using the medication immediately.    Sometimes Nyquil at night is beneficial to help you get some rest, however it is sedating and it does have an antihistamine, and tylenol.    Honey is a natural cough suppressant that can be used.    Tylenol up to 4,000 mg a day is safe for short periods and can be used for body aches, pain, and fever. However in high doses and prolonged use it can cause liver irritation.    Ibuprofen is a non-steroidal anti-inflammatory that can be used for body aches, pain, and fever.However it can also cause stomach irritation if over used.     Follow up with your PCP or specialty clinic as instructed in the next 2-3 days if not improved or as needed. You can call [phone] to schedule an appointment with appropriate provider.      If you condition worsens, we recommend that you receive another evaluation at the emergency room immediately or contact your primary medical clinic's after hours call service to discuss your concerns.      Please return here or go to the Emergency Department for any concerns or worsening condition.   You can also call [phone] to schedule an appointment with the appropriate provider.    Please return here or go to the Emergency Department for any concerns or worsening of condition.    Thank you for choosing [facility]!    Our goal in the Urgent Care is to always provide outstanding medical care. You may receive a survey by mail or e-mail in the next week regarding your experience today. We would greatly appreciate you completing and returning the survey. Your feedback provides us with a way to recognize our staff who provide very good care, and it helps us learn how to improve when your experience was below our aspiration of excellence.      We appreciate you trusting us with your medical care. We hope you feel better soon. We will be happy to take care of you for all of your future medical needs.    Sincerely,    Soni Gibson  FNP-C

## 2024-06-24 ENCOUNTER — OFFICE VISIT (OUTPATIENT)
Dept: URGENT CARE | Facility: CLINIC | Age: 29
End: 2024-06-24
Payer: COMMERCIAL

## 2024-06-24 ENCOUNTER — TELEPHONE (OUTPATIENT)
Dept: OBSTETRICS AND GYNECOLOGY | Facility: CLINIC | Age: 29
End: 2024-06-24
Payer: COMMERCIAL

## 2024-06-24 VITALS
TEMPERATURE: 98 F | HEART RATE: 76 BPM | SYSTOLIC BLOOD PRESSURE: 96 MMHG | OXYGEN SATURATION: 99 % | WEIGHT: 155 LBS | DIASTOLIC BLOOD PRESSURE: 61 MMHG | BODY MASS INDEX: 29.27 KG/M2 | HEIGHT: 61 IN | RESPIRATION RATE: 18 BRPM

## 2024-06-24 DIAGNOSIS — S43.402D SPRAIN OF LEFT SHOULDER, UNSPECIFIED SHOULDER SPRAIN TYPE, SUBSEQUENT ENCOUNTER: Primary | ICD-10-CM
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
## 2024-06-24 DIAGNOSIS — Z02.6 ENCOUNTER RELATED TO WORKER'S COMPENSATION CLAIM: ICD-10-CM

## 2024-06-24 PROCEDURE — 99214 OFFICE O/P EST MOD 30 MIN: CPT | Mod: S$GLB,,, | Performed by: SURGERY

## 2024-06-24 NOTE — LETTER
[facility]  [address] [address]  Phone: [phone]  Fax: [phone]  [facility]: [id number]    Pt Name: Cheryl Mayer  Injury Date: 06/21/2024   Employee ID:  Date of First Treatment: 06/24/2024   Company: [employer]      Appointment Time: 11:15 AM Arrived: 11:20 am   Provider: Nick Zelaya MD Time Out:12:40 pm     Office Treatment:   1. Sprain of left shoulder, unspecified shoulder sprain type, subsequent encounter    2. Encounter related to worker's compensation claim    3. Work related injury          Patient Instructions: Attention not to aggravate affected area, Apply ice 24-48 hours then apply heat/warm soaks (Take Tylenol with Ibuprofen 3x a day for at least two days)          Restrictions: Home today, No lifting/pushing/pulling more than 25 lbs     Return Appointment: 7/5/2024 at 3:45 pm    KW
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
## 2024-06-25 NOTE — TELEPHONE ENCOUNTER
Pt's vulvar conditions are persisted. Discussed recommendation to see Dr. Garcia for further evaluation.

## 2024-07-05 ENCOUNTER — OFFICE VISIT (OUTPATIENT)
Dept: URGENT CARE | Facility: CLINIC | Age: 29
End: 2024-07-05
Payer: COMMERCIAL

## 2024-07-05 VITALS
OXYGEN SATURATION: 98 % | WEIGHT: 155 LBS | RESPIRATION RATE: 16 BRPM | DIASTOLIC BLOOD PRESSURE: 66 MMHG | SYSTOLIC BLOOD PRESSURE: 109 MMHG | HEIGHT: 60 IN | HEART RATE: 88 BPM | BODY MASS INDEX: 30.43 KG/M2

## 2024-07-05 DIAGNOSIS — S43.402D SPRAIN OF LEFT SHOULDER, UNSPECIFIED SHOULDER SPRAIN TYPE, SUBSEQUENT ENCOUNTER: Primary | ICD-10-CM

## 2024-07-05 DIAGNOSIS — Z02.6 ENCOUNTER RELATED TO WORKER'S COMPENSATION CLAIM: ICD-10-CM
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
## 2024-07-05 NOTE — PROGRESS NOTES
Subjective:      Patient ID: Cheryl Mayer is a 29 y.o. female.    Chief Complaint: Shoulder Injury (lt)    Patient's place of employment - [employer]  Patient's job title - x-ray tech  Date of Injury - 06/21/2024  Body part injured - lt shoulder  Current work status per last visit - Home today, No lifting/pushing/pulling more than 25 lbs  Follow up in about 1 week (around 7/1/2024).    Improved, same, or worse - improved  Pain Scale right now (1-10) -  4/10    Shoulder Injury   Pertinent negatives include no numbness.       Musculoskeletal:  Positive for pain and trauma. Negative for joint swelling, muscle cramps and muscle ache.   Skin:  Negative for erythema.   Neurological:  Negative for numbness and tingling.     See MA note above. Begin MD note:    Cheryl Mayer is a 29 y.o. presenting for follow-up of left shoulder injury. She reports improvements in pain control with consistent use of medications daily but notes increased pain at the end of the day. She is currently working with a  and avoiding lifting and pushing machines. She continues to have pain to the anterior shoulder with raising the arm away from the body.     Objective:     Physical Exam  Vitals and nursing note reviewed.   Constitutional:       General: She is not in acute distress.     Appearance: She is not ill-appearing.   HENT:      Head: Normocephalic.   Eyes:      Conjunctiva/sclera: Conjunctivae normal.   Pulmonary:      Effort: Pulmonary effort is normal. No respiratory distress.   Musculoskeletal:      Right shoulder: Normal.      Left shoulder: Tenderness and crepitus present. Decreased range of motion.        Arms:       Cervical back: Normal range of motion and neck supple. No pain with movement or muscular tenderness.      Comments: No swelling or gross deformity appreciated. ROM is decreased, flexion to 110 degrees and abduction to 150 degrees. There is TTP of the AC joint, no tenderness to palpation of the subacromial  space, deltoid, bicep groove, trapezius, or posterior shoulder.    Skin:     General: Skin is warm.      Coloration: Skin is not pale.      Findings: No erythema.   Neurological:      General: No focal deficit present.      Mental Status: She is alert and oriented to person, place, and time.      GCS: GCS eye subscore is 4. GCS verbal subscore is 5. GCS motor subscore is 6.      Motor: Motor function is intact.      Coordination: Coordination is intact.   Psychiatric:         Attention and Perception: Attention normal.         Mood and Affect: Mood normal.         Speech: Speech normal.         Behavior: Behavior normal. Behavior is cooperative.         Thought Content: Thought content normal.        Assessment:      1. Sprain of left shoulder, unspecified shoulder sprain type, subsequent encounter    2. Encounter related to worker's compensation claim    3. Work related injury      Plan:     She would benefit from a course of PT to address her left should functional deficits, referral sent. I advised that initiation of PT can cause a transient increase in her pain symptoms, she should use the prescribed medications prior to or following PT to address this pain, it should improve as she progress through PT. Work restrictions are to be gradually reduced as she notes improvements with PT treatment. Follow-up after completion of first portion of PT treatment course       Diagnoses and plan discussed with the patient, as well as the expected course and duration of symptoms. Risks and benefits of any medication prescribed during this visit was explained, verbal instructions on use given. Clinic/Emergency department return precautions were given, can return to [facility] before scheduled follow-up appointment if notes worsening/aggravation of symptoms. All questions and concerns were addressed prior to discharge. Plan was developed with active input from the patient and they verbalized understanding of and agreement with  the POC.  [facility] was informed of any referrals and relevant orders.  Note was dictated with voice recognition software, please excuse any grammatical errors.    I spent a total of 25 minutes on the day of the visit.  This includes face to face time and non-face to face time preparing to see the patient (e.g. review of medical record), obtaining and/or reviewing separately obtained history, documenting clinical information in the electronic or other health record, independently interpreting results and communicating results to the patient/family/caregiver, or care coordinator.    Patient Instructions: Begin Physical Therapy, PT to be scheduled once authorized, Attention not to aggravate affected area   Restrictions: No lifting/pushing/pulling more than 25 lbs  Follow up in about 4 weeks (around 8/2/2024).

## 2024-07-05 NOTE — LETTER
[facility]  [address] [address]  Phone: [phone]  Fax: [phone]  [facility]: [id number]    Pt Name: Cheryl Mayer  Injury Date: 06/21/2024   Employee ID:  Date of First Treatment: 07/05/2024   Company: [employer]      Appointment Time: 03:45 PM Arrived: 04:00 PM   Provider: Nick Zelaya MD Time Out:04:45 PM     Office Treatment:   1. Sprain of left shoulder, unspecified shoulder sprain type, subsequent encounter    2. Encounter related to worker's compensation claim    3. Work related injury          Patient Instructions: Begin Physical Therapy, PT to be scheduled once authorized, Attention not to aggravate affected area    Restrictions: No lifting/pushing/pulling more than 25 lbs     Return Appointment:     8/2/2024 at 04:00 PM

## 2024-07-11 ENCOUNTER — CLINICAL SUPPORT (OUTPATIENT)
Dept: REHABILITATION | Facility: HOSPITAL | Age: 29
End: 2024-07-11
Attending: SURGERY
Payer: COMMERCIAL

## 2024-07-11 DIAGNOSIS — M25.612 DECREASED RANGE OF MOTION OF LEFT SHOULDER: Primary | ICD-10-CM

## 2024-07-11 PROCEDURE — 97530 THERAPEUTIC ACTIVITIES: CPT

## 2024-07-11 PROCEDURE — 97161 PT EVAL LOW COMPLEX 20 MIN: CPT

## 2024-07-11 NOTE — PROGRESS NOTES
[facility]   Physical Therapy Initial Evaluation      Name: Cheryl Mayer  [facility] Number: [id number]    Therapy Diagnosis:   Encounter Diagnosis   Name Primary?    Decreased range of motion of left shoulder Yes        Physician: Nick Zelaya MD    Physician Orders: PT Eval and Treat   Medical Diagnosis from Referral: Encounter related to worker's compensation claim [Z02.6], Sprain of left shoulder, unspecified shoulder sprain type, subsequent encounter [S43.402D], Work related injury [Y99.0]   Evaluation Date: 7/11/2024  Authorization Period Expiration: 7/5/2025  Plan of Care Expiration: 9/30/2024  Progress Note Due: 8/11/2024  Date of Surgery: N/A  Visit # / Visits authorized: 1/12   FOTO: 1/ 3    Precautions: Standard     Time In: 5:04 PM  Time Out: 6:00 PM  Total Billable Time: 56 minutes    Subjective     Date of onset: 3 weeks ago    History of current condition - [place] reports: 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 Initially had some numbness/tingling down into the hand for a day or two but now just has pain in superior portion of left shoulder and occasionally into bicep region. Has some pain also with sleeping on the shoulder. 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 No prior shoulder injuries. States no catching or locking and denies neck pain as well.       Imaging: X-ray 6/21/24  FINDINGS:  The bone mineralization is within normal limits.  There is no cortical step-off.  There is no evidence of periostitis.     The glenohumeral articulation is maintained.  The acromioclavicular joint is within normal limits.  The coracoclavicular interval is unremarkable.     The visualized left hemithorax is unremarkable.     There is no evidence of a fracture or dislocation.     Impression:  No acute  process.    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; not pushing anything heavier than     Pain:  Current 4/10, worst 6/10, best 2/10   Location: left superior shoulder region (AC joint/suprascapular notch region)   Description: Aching, Dull, Superficial, Sharp, and Shooting  Aggravating Factors: Night Time, Morning, Flexing, and Lifting  Easing Factors: rest and Tylenol    Patients goals: improve pain in the left shoulder so that she can return to full work/lifting status and be able to tolerate ADLs; be able to  her 3 y/o child without pain in shoulder     Medical History:   Past Medical History:   Diagnosis Date    Asthma     H/O:      Ovarian cyst        Surgical History:   Cheryl Mayer  has a past surgical history that includes  section.    Medications:   Cheryl has a current medication list which includes the following prescription(s): butalbital-acetaminophen-caffeine -40 mg, cholecalciferol (vitamin d3), clindamycin-benzoyl peroxide, clobetasol, diltiazem hcl, estradiol, fluconazole, ketoconazole, norelgestromin-ethinyl estradiol, pantoprazole, tramadol, and triamcinolone acetonide 0.1%.    Allergies:   Review of patient's allergies indicates:   Allergen Reactions    Docusate Hives    Vancomycin Hives    Morphine     Penicillins         Objective      Observation: alert, oriented, calm    Posture: right > Left scapular downward rotation; left > right scapular abduction     Passive Range of Motion:   Shoulder Right Left   Flexion 170 140 *   Abduction 170 95 *   ER at 0 70 65   ER at 90 110 90 *   IR 45 35 *      Active Range of Motion:   Shoulder Right Left   Flexion 170 130 *    Abduction 170 90 *   ER at 0 70 65   IR 40 35 *   Reach behind head  Full, pain-free to T2 Limited, able to reach to back of occiput   Reach behind back  Full, pain-free    Sacrum/L5, painful posterior/superior shoulder    *denotes pain with motion     Strength:  Shoulder Right Left   Flexion 4+/5 4-/5 *   Abduction 4+/5 4-/5 *   ER 4/5 3+/5 *   IR 4+/5 4-/5 *   Serratus Anterior 3-/5 3-/5   Middle Trap 3/5 3/5   Low Trap 3/5 3-/5       Special Tests:  Impingement Cluster:   Right Left   Hawkin's Kenndy - +   Painful Arc - +   Resisted ER - +     Rotator Cuff Tear   Right Left   Painful Arc - +   Drop Arm test - -   Resisted ER - +     Instability:   Right Left   Anterior Apprehension Test - -   Relocation test - -   Sulcus Sign - -     SLAP:   Right Left   Biceps Load II - +   O'Presley's Test - +     Cross Body Adduction test: negative     Joint Mobility: empty end-feel left GH joint into inferior direction; slightly limited posteriorly but unable to fully assess due to guarding     Palpation: TTP sciatic notch and near AC joint     Sensation: intact light touch bilaterally     Flexibility: TBA at follow up     Intake Outcome Measure for FOTO Shoulder Survey    Therapist reviewed FOTO scores for Cheryl Mayer on 7/11/2024.   FOTO report - see Media section or FOTO account episode details.    Intake Score: 45%         Treatment     Total Treatment time (time-based codes) separate from Evaluation: 20 minutes     Cheryl received the treatments listed below:      therapeutic activities to improve functional performance for 20 minutes, including:    HEP:  Table slides  Seated thoracic extension  ER isometrics at wall submaximal effort  Prone mid trap/scapular adduction activation    Education:  - Role of PT   - POC/Prognosis   - Activity modifications     Patient Education and Home Exercises     Education provided:   - HEP    Written Home Exercises Provided: yes. Exercises were reviewed and Cheryl was able to demonstrate them prior to the end of the session.  Cheryl demonstrated good   understanding of the education provided. See EMR under Patient Instructions for exercises provided during therapy sessions.    Assessment     Cheryl is a 29 y.o. female referred to outpatient Physical Therapy with a medical diagnosis of Encounter related to worker's compensation claim [Z02.6], Sprain of left shoulder, unspecified shoulder sprain type, subsequent encounter [S43402D], Work related injury [Y99.0]. Patient presents with decreased and painful left shoulder range of motion, decreased strength of periscapular and rotator cuff musculature, and limitations with overhead reaching/lifting ability. Patient would benefit from skilled therapy to improve upon these deficits and aid in her return to full working requirements.     Patient prognosis is Good.   Patient will benefit from skilled outpatient Physical Therapy to address the deficits stated above and in the chart below, provide patient /family education, and to maximize patientt's level of independence.     Plan of care discussed with patient: Yes  Patient's spiritual, cultural and educational needs considered and patient is agreeable to the plan of care and goals as stated below:     Anticipated Barriers for therapy: none    Medical Necessity is demonstrated by the following  History  Co-morbidities and personal factors that may impact the plan of care [] LOW: no personal factors / co-morbidities  [x] MODERATE: 1-2 personal factors / co-morbidities  [] HIGH: 3+ personal factors / co-morbidities    Moderate / High Support Documentation:   Co-morbidities affecting plan of care: Asthma    Personal Factors:   no deficits     Examination  Body Structures and Functions, activity limitations and participation restrictions that may impact the plan of care [] LOW: addressing 1-2 elements  [x] MODERATE: 3+ elements  [] HIGH: 4+ elements (please support below)    Moderate / High Support Documentation: decreased range of motion, decreased strength, limited ability to  lift child and pain with sleeping      Clinical Presentation [x] LOW: stable  [] MODERATE: Evolving  [] HIGH: Unstable     Decision Making/ Complexity Score: low       Goals:  Short Term Goals:  4 weeks  1.Report decreased left shoulder pain < / =  2/10 to increase tolerance for progressing exercises above 90 degrees of flexion.   2. Increase PROM full and pain-free into flexion and abduction in order to improve ability to progress exercises.    3. Increased strength by 1/3 MMT grade in Left shoulder RTC and periscapular musculature to increase tolerance for ADL and work activities.  4. Pt to tolerate HEP to improve ROM and independence with ADL's    Long Term Goals: 10 weeks  1.Report decreased left shoulder pain  </= 1/10  to increase tolerance for progressing resistance exercises in overhead positions.   2.Increase AROM to full and pain-free to show good tolerance for overhead reaching.   3.Increase strength to >/= 4/5 in Left shoulder RTC and periscapular musculature to increase tolerance for ADL and work activities.  4. Pt goal: improve pain in the left shoulder so that she can return to full work/lifting status and be able to tolerate ADLs; be able to  her 3 y/o child without pain in shoulder.   5. Pt will have improved score of >/= 68% on FOTO shoulder in order to demonstrate true functional improvement.   Plan     Plan of care Certification: 7/11/2024 to 9/30/2024.    Outpatient Physical Therapy 2 times weekly for 10 weeks to include the following interventions: Manual Therapy, Moist Heat/ Ice, Neuromuscular Re-ed, Patient Education, Therapeutic Activities, Therapeutic Exercise, and Ultrasound.     Berto King, PT        Physician's Signature: _________________________________________ Date: ________________

## 2024-07-12 NOTE — PLAN OF CARE
[facility]   Physical Therapy Initial Evaluation      Name: Cheryl Mayer  [facility] Number: [id number]    Therapy Diagnosis:   Encounter Diagnosis   Name Primary?    Decreased range of motion of left shoulder Yes        Physician: Nick Zelaya MD    Physician Orders: PT Eval and Treat   Medical Diagnosis from Referral: Encounter related to worker's compensation claim [Z02.6], Sprain of left shoulder, unspecified shoulder sprain type, subsequent encounter [S43.402D], Work related injury [Y99.0]   Evaluation Date: 7/11/2024  Authorization Period Expiration: 7/5/2025  Plan of Care Expiration: 9/30/2024  Progress Note Due: 8/11/2024  Date of Surgery: N/A  Visit # / Visits authorized: 1/12   FOTO: 1/ 3    Precautions: Standard     Time In: 5:04 PM  Time Out: 6:00 PM  Total Billable Time: 56 minutes    Subjective     Date of onset: 3 weeks ago    History of current condition - [place] reports: 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 Initially had some numbness/tingling down into the hand for a day or two but now just has pain in superior portion of left shoulder and occasionally into bicep region. Has some pain also with sleeping on the shoulder. 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 No prior shoulder injuries. States no catching or locking and denies neck pain as well.       Imaging: X-ray 6/21/24  FINDINGS:  The bone mineralization is within normal limits.  There is no cortical step-off.  There is no evidence of periostitis.     The glenohumeral articulation is maintained.  The acromioclavicular joint is within normal limits.  The coracoclavicular interval is unremarkable.     The visualized left hemithorax is unremarkable.     There is no evidence of a fracture or dislocation.     Impression:  No acute  process.    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; not pushing anything heavier than     Pain:  Current 4/10, worst 6/10, best 2/10   Location: left superior shoulder region (AC joint/suprascapular notch region)   Description: Aching, Dull, Superficial, Sharp, and Shooting  Aggravating Factors: Night Time, Morning, Flexing, and Lifting  Easing Factors: rest and Tylenol    Patients goals: improve pain in the left shoulder so that she can return to full work/lifting status and be able to tolerate ADLs; be able to  her 5 y/o child without pain in shoulder     Medical History:   Past Medical History:   Diagnosis Date    Asthma     H/O:      Ovarian cyst        Surgical History:   Cheryl Mayer  has a past surgical history that includes  section.    Medications:   Cheryl has a current medication list which includes the following prescription(s): butalbital-acetaminophen-caffeine -40 mg, cholecalciferol (vitamin d3), clindamycin-benzoyl peroxide, clobetasol, diltiazem hcl, estradiol, fluconazole, ketoconazole, norelgestromin-ethinyl estradiol, pantoprazole, tramadol, and triamcinolone acetonide 0.1%.    Allergies:   Review of patient's allergies indicates:   Allergen Reactions    Docusate Hives    Vancomycin Hives    Morphine     Penicillins         Objective      Observation: alert, oriented, calm    Posture: right > Left scapular downward rotation; left > right scapular abduction     Passive Range of Motion:   Shoulder Right Left   Flexion 170 140 *   Abduction 170 95 *   ER at 0 70 65   ER at 90 110 90 *   IR 45 35 *      Active Range of Motion:   Shoulder Right Left   Flexion 170 130 *    Abduction 170 90 *   ER at 0 70 65   IR 40 35 *   Reach behind head  Full, pain-free to T2 Limited, able to reach to back of occiput   Reach behind back  Full, pain-free    Sacrum/L5, painful posterior/superior shoulder    *denotes pain with motion     Strength:  Shoulder Right Left   Flexion 4+/5 4-/5 *   Abduction 4+/5 4-/5 *   ER 4/5 3+/5 *   IR 4+/5 4-/5 *   Serratus Anterior 3-/5 3-/5   Middle Trap 3/5 3/5   Low Trap 3/5 3-/5       Special Tests:  Impingement Cluster:   Right Left   Hawkin's Kenndy - +   Painful Arc - +   Resisted ER - +     Rotator Cuff Tear   Right Left   Painful Arc - +   Drop Arm test - -   Resisted ER - +     Instability:   Right Left   Anterior Apprehension Test - -   Relocation test - -   Sulcus Sign - -     SLAP:   Right Left   Biceps Load II - +   O'Presley's Test - +     Cross Body Adduction test: negative     Joint Mobility: empty end-feel left GH joint into inferior direction; slightly limited posteriorly but unable to fully assess due to guarding     Palpation: TTP sciatic notch and near AC joint     Sensation: intact light touch bilaterally     Flexibility: TBA at follow up     Intake Outcome Measure for FOTO Shoulder Survey    Therapist reviewed FOTO scores for Cheryl Mayer on 7/11/2024.   FOTO report - see Media section or FOTO account episode details.    Intake Score: 45%         Treatment     Total Treatment time (time-based codes) separate from Evaluation: 20 minutes     Cheryl received the treatments listed below:      therapeutic activities to improve functional performance for 20 minutes, including:    HEP:  Table slides  Seated thoracic extension  ER isometrics at wall submaximal effort  Prone mid trap/scapular adduction activation    Education:  - Role of PT   - POC/Prognosis   - Activity modifications     Patient Education and Home Exercises     Education provided:   - HEP    Written Home Exercises Provided: yes. Exercises were reviewed and Cheryl was able to demonstrate them prior to the end of the session.  Cheryl demonstrated good   understanding of the education provided. See EMR under Patient Instructions for exercises provided during therapy sessions.    Assessment     Cheryl is a 29 y.o. female referred to outpatient Physical Therapy with a medical diagnosis of Encounter related to worker's compensation claim [Z02.6], Sprain of left shoulder, unspecified shoulder sprain type, subsequent encounter [S43402D], Work related injury [Y99.0]. Patient presents with decreased and painful left shoulder range of motion, decreased strength of periscapular and rotator cuff musculature, and limitations with overhead reaching/lifting ability. Patient would benefit from skilled therapy to improve upon these deficits and aid in her return to full working requirements.     Patient prognosis is Good.   Patient will benefit from skilled outpatient Physical Therapy to address the deficits stated above and in the chart below, provide patient /family education, and to maximize patientt's level of independence.     Plan of care discussed with patient: Yes  Patient's spiritual, cultural and educational needs considered and patient is agreeable to the plan of care and goals as stated below:     Anticipated Barriers for therapy: none    Medical Necessity is demonstrated by the following  History  Co-morbidities and personal factors that may impact the plan of care [] LOW: no personal factors / co-morbidities  [x] MODERATE: 1-2 personal factors / co-morbidities  [] HIGH: 3+ personal factors / co-morbidities    Moderate / High Support Documentation:   Co-morbidities affecting plan of care: Asthma    Personal Factors:   no deficits     Examination  Body Structures and Functions, activity limitations and participation restrictions that may impact the plan of care [] LOW: addressing 1-2 elements  [x] MODERATE: 3+ elements  [] HIGH: 4+ elements (please support below)    Moderate / High Support Documentation: decreased range of motion, decreased strength, limited ability to  lift child and pain with sleeping      Clinical Presentation [x] LOW: stable  [] MODERATE: Evolving  [] HIGH: Unstable     Decision Making/ Complexity Score: low       Goals:  Short Term Goals:  4 weeks  1.Report decreased left shoulder pain < / =  2/10 to increase tolerance for progressing exercises above 90 degrees of flexion.   2. Increase PROM full and pain-free into flexion and abduction in order to improve ability to progress exercises.    3. Increased strength by 1/3 MMT grade in Left shoulder RTC and periscapular musculature to increase tolerance for ADL and work activities.  4. Pt to tolerate HEP to improve ROM and independence with ADL's    Long Term Goals: 10 weeks  1.Report decreased left shoulder pain  </= 1/10  to increase tolerance for progressing resistance exercises in overhead positions.   2.Increase AROM to full and pain-free to show good tolerance for overhead reaching.   3.Increase strength to >/= 4/5 in Left shoulder RTC and periscapular musculature to increase tolerance for ADL and work activities.  4. Pt goal: improve pain in the left shoulder so that she can return to full work/lifting status and be able to tolerate ADLs; be able to  her 5 y/o child without pain in shoulder.   5. Pt will have improved score of >/= 68% on FOTO shoulder in order to demonstrate true functional improvement.   Plan     Plan of care Certification: 7/11/2024 to 9/30/2024.    Outpatient Physical Therapy 2 times weekly for 10 weeks to include the following interventions: Manual Therapy, Moist Heat/ Ice, Neuromuscular Re-ed, Patient Education, Therapeutic Activities, Therapeutic Exercise, and Ultrasound.     Berto King, PT        Physician's Signature: _________________________________________ Date: ________________

## 2024-07-23 ENCOUNTER — CLINICAL SUPPORT (OUTPATIENT)
Dept: REHABILITATION | Facility: HOSPITAL | Age: 29
End: 2024-07-23
Payer: COMMERCIAL

## 2024-07-23 DIAGNOSIS — M25.612 DECREASED RANGE OF MOTION OF LEFT SHOULDER: Primary | ICD-10-CM

## 2024-07-23 PROCEDURE — 97140 MANUAL THERAPY 1/> REGIONS: CPT

## 2024-07-23 PROCEDURE — 97112 NEUROMUSCULAR REEDUCATION: CPT

## 2024-07-23 NOTE — PROGRESS NOTES
[facility]   Physical Therapy Treatment Note      [id number]    Therapy Diagnosis:   Encounter Diagnosis   Name Primary?    Decreased range of motion of left shoulder Yes     Physician: Nick Zelaya MD    Visit Date: 7/23/2024    Physician Orders: PT Eval and Treat   Medical Diagnosis from Referral: Encounter related to worker's compensation claim [Z02.6], Sprain of left shoulder, unspecified shoulder sprain type, subsequent encounter [S43.402D], Work related injury [Y99.0]   Evaluation Date: 7/11/2024  Authorization Period Expiration: 7/5/2025  Plan of Care Expiration: 9/30/2024  Progress Note Due: 8/11/2024  Date of Surgery: N/A  Visit # / Visits authorized:  2/12   FOTO: 1/ 3     Precautions: Standard      Time In: 5:04 PM  Time Out: 5:58 PM  Total Billable Time: 54 minutes    Subjective     Patient reports: she has been feeling a little better with the shoulder.Some of the exercises have been helping but she still has pain with end-range shoulder elevation.     She was compliant with home exercise program.    Response to previous treatment: first follow up   Functional change: first follow up     Pain: 3/10  Location: left superior/posterior shoulder     Objective      Objective Measures updated at progress report unless specified.     Treatment     Cheryl received the treatments listed below:      therapeutic exercises to develop strength, endurance, and ROM for 00 minutes including:    Not performed today:  Seated thoracic extension    manual therapy techniques: Joint mobilizations and Soft tissue Mobilization were applied to the: left shoulder, AC joint for 09 minutes, including:    Scapular upward rotation mobs in sidelying  Posterior/inferior mobs Left GH, Grades II/III  Posterior centration with IR submax isometrics, 5 x 10 sec holds     neuromuscular re-education activities to improve: Coordination, Kinesthetic, Sense, Proprioception,  "and Posture for 45 minutes. The following activities were included:    ER isometrics at wall submaximal effort, 5 x 10 sec holds   Abduction isometrics at multiple angles, 5 x 5 sec holds   Upper trap shrugs level 2, 10 x 10 sec holds   Sidelying serratus punches, 3 x 10 with 3" holds into protraction   Standing serratus landmine with dowel 2lb - d/c due to pain after 10 reps   Prone mid trap/scapular adduction activation, 2 x 10     therapeutic activities to improve functional performance for 00  minutes, including:        Patient Education and Home Exercises       Education provided:   - HEP     Written Home Exercises Provided: Patient instructed to cont prior HEP. Exercises were reviewed and Cheryl was able to demonstrate them prior to the end of the session.  Cheryl demonstrated good  understanding of the education provided. See Electronic Medical Record under Patient Instructions for exercises provided during therapy sessions    Assessment     Cheryl returns for her first follow up visit with improvements in her ability to abduct and elevate the left shoulder into full range of motion however has superior (AC joint) pain noted at end-range. She shows mild improvement in ability to activate RTC, but has limitations remaining with ER activation. She noted AC joint pain with positive Paxinos test, positive Sherwood's, crossbody adduction, and with end-range flexion/abduction. Patient educated on continued activity modifications as she improves with progressing pain-free range of motion at this time.     Cheryl Is progressing well towards her goals.   Patient prognosis is Good.     Patient will continue to benefit from skilled outpatient physical therapy to address the deficits listed in the problem list box on initial evaluation, provide pt/family education and to maximize pt's level of independence in the home and community environment.     Patient's spiritual, cultural and educational needs considered and pt agreeable " to plan of care and goals.     Anticipated barriers to physical therapy: none    Goals:   Short Term Goals:  4 weeks  1.Report decreased left shoulder pain < / =  2/10 to increase tolerance for progressing exercises above 90 degrees of flexion. - Progressing, Not met   2. Increase PROM full and pain-free into flexion and abduction in order to improve ability to progress exercises. - Progressing, Not met   3. Increased strength by 1/3 MMT grade in Left shoulder RTC and periscapular musculature to increase tolerance for ADL and work activities. - Progressing, Not met   4. Pt to tolerate HEP to improve ROM and independence with ADL's - Progressing, Not met      Long Term Goals: 10 weeks  1.Report decreased left shoulder pain  </= 1/10  to increase tolerance for progressing resistance exercises in overhead positions. - Progressing, Not met   2.Increase AROM to full and pain-free to show good tolerance for overhead reaching.  - Progressing, Not met   3.Increase strength to >/= 4/5 in Left shoulder RTC and periscapular musculature to increase tolerance for ADL and work activities. - Progressing, Not met   4. Pt goal: improve pain in the left shoulder so that she can return to full work/lifting status and be able to tolerate ADLs; be able to  her 5 y/o child without pain in shoulder. - Progressing, Not met   5. Pt will have improved score of >/= 68% on FOTO shoulder in order to demonstrate true functional improvement. - Progressing, Not met     Plan     Plan of care Certification: 7/11/2024 to 9/30/2024.     Outpatient Physical Therapy 2 times weekly for 10 weeks to include the following interventions: Manual Therapy, Moist Heat/ Ice, Neuromuscular Re-ed, Patient Education, Therapeutic Activities, Therapeutic Exercise, and Ultrasound.     Berto King, PT

## 2024-07-30 ENCOUNTER — CLINICAL SUPPORT (OUTPATIENT)
Dept: REHABILITATION | Facility: HOSPITAL | Age: 29
End: 2024-07-30
Payer: COMMERCIAL

## 2024-07-30 DIAGNOSIS — M25.612 DECREASED RANGE OF MOTION OF LEFT SHOULDER: Primary | ICD-10-CM

## 2024-07-30 PROCEDURE — 97140 MANUAL THERAPY 1/> REGIONS: CPT

## 2024-07-30 PROCEDURE — 97112 NEUROMUSCULAR REEDUCATION: CPT

## 2024-07-30 NOTE — PROGRESS NOTES
[facility]   Physical Therapy Treatment Note      [id number]    Therapy Diagnosis:   Encounter Diagnosis   Name Primary?    Decreased range of motion of left shoulder Yes       Physician: Nikc Zelaya MD    Visit Date: 7/30/2024    Physician Orders: PT Eval and Treat   Medical Diagnosis from Referral: Encounter related to worker's compensation claim [Z02.6], Sprain of left shoulder, unspecified shoulder sprain type, subsequent encounter [S43.402D], Work related injury [Y99.0]   Evaluation Date: 7/11/2024  Authorization Period Expiration: 7/5/2025  Plan of Care Expiration: 9/30/2024  Progress Note Due: 8/11/2024  Date of Surgery: N/A  Visit # / Visits authorized:  3/12   FOTO: 1/ 3     Precautions: Standard      Time In: 5:00 PM  Time Out: 5:56 PM  Total Billable Time: 56 minutes    Subjective     Patient reports: she is feeling a little bit better.     She was compliant with home exercise program.    Response to previous treatment: slight improvement   Functional change: slightly improved lifting tolerance or motion    Pain: 2/10  Location: left superior/posterior shoulder     Objective      Objective Measures updated at progress report unless specified.     Treatment     Cheryl received the treatments listed below:      therapeutic exercises to develop strength, endurance, and ROM for 00 minutes including:    Not performed today:  Seated thoracic extension    manual therapy techniques: Joint mobilizations and Soft tissue Mobilization were applied to the: left shoulder, AC joint for 15 minutes, including:    Scapular upward rotation mobs in sidelying  Posterior/inferior mobs Left GH, Grades II/III  Posterior centration with IR submax isometrics, 5 x 10 sec holds   Rhythmic stabilization holds flexion at 90, 100 degr    neuromuscular re-education activities to improve: Coordination, Kinesthetic, Sense, Proprioception, and Posture for 41 minutes. The  "following activities were included:    ER and flexion isometrics at wall submaximal effort, 5 x 10 sec holds   Upper trap shrugs level 2, 10 x 10 sec holds   ER/IR walkouts OTB, 2 x 15   Supine serratus punches, 3 x 10 with 3" holds into protraction   Prone mid trap/scapular adduction activation, 2 x 10   Prone mid trap level 2, 3 x 10   Serratus wall slides 2 x 10 with modified range     Not performed today:  Abduction isometrics at multiple angles, 5 x 5 sec holds     therapeutic activities to improve functional performance for 00  minutes, including:        Patient Education and Home Exercises       Education provided:   - HEP     Written Home Exercises Provided: Patient instructed to cont prior HEP. Exercises were reviewed and Cheryl was able to demonstrate them prior to the end of the session.  Cheryl demonstrated good  understanding of the education provided. See Electronic Medical Record under Patient Instructions for exercises provided during therapy sessions    Assessment     Cheryl returns with improved symptoms of superior (AC joint region) left shoulder pain with full flexion. She tolerated initial portions of session well with modified wall slides and rhythmic stabilizations as well as walkouts. Continues to show some weakness or pain with later reps due to strength deficits, but has no pain with concentric or isometric RTC loading. Patient educated on continued activity modifications as she improves with progressing pain-free range of motion at this time.     Cheryl Is progressing well towards her goals.   Patient prognosis is Good.     Patient will continue to benefit from skilled outpatient physical therapy to address the deficits listed in the problem list box on initial evaluation, provide pt/family education and to maximize pt's level of independence in the home and community environment.     Patient's spiritual, cultural and educational needs considered and pt agreeable to plan of care and goals.   "   Anticipated barriers to physical therapy: none    Goals:   Short Term Goals:  4 weeks  1.Report decreased left shoulder pain < / =  2/10 to increase tolerance for progressing exercises above 90 degrees of flexion. - Progressing, Not met   2. Increase PROM full and pain-free into flexion and abduction in order to improve ability to progress exercises. - Progressing, Not met   3. Increased strength by 1/3 MMT grade in Left shoulder RTC and periscapular musculature to increase tolerance for ADL and work activities. - Progressing, Not met   4. Pt to tolerate HEP to improve ROM and independence with ADL's - Progressing, Not met      Long Term Goals: 10 weeks  1.Report decreased left shoulder pain  </= 1/10  to increase tolerance for progressing resistance exercises in overhead positions. - Progressing, Not met   2.Increase AROM to full and pain-free to show good tolerance for overhead reaching.  - Progressing, Not met   3.Increase strength to >/= 4/5 in Left shoulder RTC and periscapular musculature to increase tolerance for ADL and work activities. - Progressing, Not met   4. Pt goal: improve pain in the left shoulder so that she can return to full work/lifting status and be able to tolerate ADLs; be able to  her 3 y/o child without pain in shoulder. - Progressing, Not met   5. Pt will have improved score of >/= 68% on FOTO shoulder in order to demonstrate true functional improvement. - Progressing, Not met     Plan     Plan of care Certification: 7/11/2024 to 9/30/2024.     Outpatient Physical Therapy 2 times weekly for 10 weeks to include the following interventions: Manual Therapy, Moist Heat/ Ice, Neuromuscular Re-ed, Patient Education, Therapeutic Activities, Therapeutic Exercise, and Ultrasound.     Berto King, PT

## 2024-07-31 ENCOUNTER — TELEPHONE (OUTPATIENT)
Dept: DERMATOLOGY | Facility: CLINIC | Age: 29
End: 2024-07-31
Payer: COMMERCIAL

## 2024-08-01 ENCOUNTER — OFFICE VISIT (OUTPATIENT)
Dept: SURGERY | Facility: CLINIC | Age: 29
End: 2024-08-01
Payer: COMMERCIAL

## 2024-08-01 ENCOUNTER — CLINICAL SUPPORT (OUTPATIENT)
Dept: REHABILITATION | Facility: HOSPITAL | Age: 29
End: 2024-08-01
Payer: COMMERCIAL

## 2024-08-01 VITALS
BODY MASS INDEX: 30.53 KG/M2 | WEIGHT: 156.31 LBS | DIASTOLIC BLOOD PRESSURE: 76 MMHG | HEART RATE: 77 BPM | SYSTOLIC BLOOD PRESSURE: 106 MMHG

## 2024-08-01 DIAGNOSIS — K62.89 ANAL IRRITATION: Primary | ICD-10-CM

## 2024-08-01 DIAGNOSIS — M25.612 DECREASED RANGE OF MOTION OF LEFT SHOULDER: Primary | ICD-10-CM

## 2024-08-01 PROCEDURE — 1160F RVW MEDS BY RX/DR IN RCRD: CPT | Mod: CPTII,S$GLB,, | Performed by: NURSE PRACTITIONER

## 2024-08-01 PROCEDURE — 3008F BODY MASS INDEX DOCD: CPT | Mod: CPTII,S$GLB,, | Performed by: NURSE PRACTITIONER

## 2024-08-01 PROCEDURE — 99213 OFFICE O/P EST LOW 20 MIN: CPT | Mod: S$GLB,,, | Performed by: NURSE PRACTITIONER

## 2024-08-01 PROCEDURE — 3074F SYST BP LT 130 MM HG: CPT | Mod: CPTII,S$GLB,, | Performed by: NURSE PRACTITIONER

## 2024-08-01 PROCEDURE — 99999 PR PBB SHADOW E&M-EST. PATIENT-LVL III: CPT | Mod: PBBFAC,,, | Performed by: NURSE PRACTITIONER

## 2024-08-01 PROCEDURE — 97110 THERAPEUTIC EXERCISES: CPT

## 2024-08-01 PROCEDURE — 3078F DIAST BP <80 MM HG: CPT | Mod: CPTII,S$GLB,, | Performed by: NURSE PRACTITIONER

## 2024-08-01 PROCEDURE — 97140 MANUAL THERAPY 1/> REGIONS: CPT

## 2024-08-01 PROCEDURE — 1159F MED LIST DOCD IN RCRD: CPT | Mod: CPTII,S$GLB,, | Performed by: NURSE PRACTITIONER

## 2024-08-01 PROCEDURE — 97112 NEUROMUSCULAR REEDUCATION: CPT

## 2024-08-01 NOTE — PROGRESS NOTES
"[facility]   Physical Therapy Treatment Note      Name: Cheryl Mayer  [facility] Number: [id number]    Therapy Diagnosis:   Encounter Diagnosis   Name Primary?    Decreased range of motion of left shoulder Yes         Physician: Nick Zelaya MD    Visit Date: 8/1/2024    Physician Orders: PT Eval and Treat   Medical Diagnosis from Referral: Encounter related to worker's compensation claim [Z02.6], Sprain of left shoulder, unspecified shoulder sprain type, subsequent encounter [S43.402D], Work related injury [Y99.0]   Evaluation Date: 7/11/2024  Authorization Period Expiration: 7/5/2025  Plan of Care Expiration: 9/30/2024  Progress Note Due: 8/11/2024  Date of Surgery: N/A  Visit # / Visits authorized:  4/12   FOTO: 1/ 3     Precautions: Standard      Time In: 5:02 PM  Time Out: 6:00 PM  Total Billable Time: 58 minutes    Subjective     Patient reports: shoulder is feeling better. Having some pain when she moves her neck.     She was compliant with home exercise program.    Response to previous treatment: slight improvement   Functional change: slightly improved lifting tolerance or motion    Pain: 2/10  Location: left superior/posterior shoulder     Objective      Objective Measures updated at progress report unless specified.     Cervical spine ROM:  Left Sidebend and Left rotation = pain into the upper trap  Flexion = no pain  Extension = mild pain into upper trap    Joint mobility:  Cervical spine = empty end feel with lower cervical rotation; limited C1-C2 Left rotation  Thoracic spine = limited mobility into extension mid thoracic region    + for scapular assist upward rotation for reducing left rotation and left sidebend    Treatment     Cheryl received the treatments listed below:      therapeutic exercises to develop strength, endurance, and ROM for 26 minutes including:    Assessment (noted above)   Seated thoracic extension 2 x 20 with 3" hold  Sidelying open books 2 x 20 " "  HEP update/Education     manual therapy techniques: Joint mobilizations and Soft tissue Mobilization were applied to the: left shoulder, AC joint for 19  minutes, including:    Scapular upward rotation mobs in sidelying  Prone thoracic PA mobs, grades III/IV  C1-C2 left rotation METs, 4 x 6 sec holds  Posterior/inferior mobs Left GH, Grades II/III    Not performed today:  Posterior centration with IR submax isometrics, 5 x 10 sec holds   Rhythmic stabilization holds flexion at 90, 100 degr    neuromuscular re-education activities to improve: Coordination, Kinesthetic, Sense, Proprioception, and Posture for 13 minutes. The following activities were included:    Serratus punches at 90 and 100 degr 3#, 3 x 10 with 3" hold  Upper trap shrugs level 2, 10 x 10 sec holds  - d/c due to pain in upper trap/shoulder/neck  Supine chin tuck holds, 5 x 10 sec holds    Not performed today:  ER and flexion isometrics at wall submaximal effort, 5 x 10 sec holds   ER/IR walkouts OTB, 2 x 15   Supine serratus punches, 3 x 10 with 3" holds into protraction   Prone mid trap/scapular adduction activation, 2 x 10   Prone mid trap level 2, 3 x 10   Serratus wall slides 2 x 10 with modified range   Abduction isometrics at multiple angles, 5 x 5 sec holds     therapeutic activities to improve functional performance for 00  minutes, including:        Patient Education and Home Exercises       Education provided:   - HEP     Written Home Exercises Provided: Patient instructed to cont prior HEP. Exercises were reviewed and Cheryl was able to demonstrate them prior to the end of the session.  Cheryl demonstrated good  understanding of the education provided. See Electronic Medical Record under Patient Instructions for exercises provided during therapy sessions    Assessment     Cheryl returns with slightly improved symptoms of left shoulder pain with full flexion. She was assessed to have cervical spine referral pain with left side bend and left " rotation. This was reduced with scapular assist for upward rotation. Tolerated chin tucks but requires cueing for guarding reduction and serratus activation holds. Patient HEP updated.     Cheryl Is progressing well towards her goals.     Patient prognosis is Good.     Patient will continue to benefit from skilled outpatient physical therapy to address the deficits listed in the problem list box on initial evaluation, provide pt/family education and to maximize pt's level of independence in the home and community environment.     Patient's spiritual, cultural and educational needs considered and pt agreeable to plan of care and goals.     Anticipated barriers to physical therapy: none    Goals:   Short Term Goals:  4 weeks  1.Report decreased left shoulder pain < / =  2/10 to increase tolerance for progressing exercises above 90 degrees of flexion. - Progressing, Not met   2. Increase PROM full and pain-free into flexion and abduction in order to improve ability to progress exercises. - Progressing, Not met   3. Increased strength by 1/3 MMT grade in Left shoulder RTC and periscapular musculature to increase tolerance for ADL and work activities. - Progressing, Not met   4. Pt to tolerate HEP to improve ROM and independence with ADL's - Progressing, Not met      Long Term Goals: 10 weeks  1.Report decreased left shoulder pain  </= 1/10  to increase tolerance for progressing resistance exercises in overhead positions. - Progressing, Not met   2.Increase AROM to full and pain-free to show good tolerance for overhead reaching.  - Progressing, Not met   3.Increase strength to >/= 4/5 in Left shoulder RTC and periscapular musculature to increase tolerance for ADL and work activities. - Progressing, Not met   4. Pt goal: improve pain in the left shoulder so that she can return to full work/lifting status and be able to tolerate ADLs; be able to  her 3 y/o child without pain in shoulder. - Progressing, Not met   5.  Pt will have improved score of >/= 68% on FOTO shoulder in order to demonstrate true functional improvement. - Progressing, Not met     Plan     Plan of care Certification: 7/11/2024 to 9/30/2024.     Outpatient Physical Therapy 2 times weekly for 10 weeks to include the following interventions: Manual Therapy, Moist Heat/ Ice, Neuromuscular Re-ed, Patient Education, Therapeutic Activities, Therapeutic Exercise, and Ultrasound.     Berto King, PT

## 2024-08-01 NOTE — PROGRESS NOTES
CRS Office Visit History and Physical    Referring Md:   No referring provider defined for this encounter.    SUBJECTIVE:     History of Present Illness 4/10/2024:  The patient is est patient to this practice. New to me. Seen a month ago for anal itching and brbpr. Itching is worse but has appt w Derm in 5 days  She is concerned with the BRBPR  Course is as follows:  Patient is a 29 y.o. female presents with BRBPR.  No help, she thinks it has it gotten worse  The rectal bleeding is worse as well. She reports the anus has bleeding, sometimes sees a break at the anus toward the back  When she wipes, theres a lot discomfort.   But the act of having a BM is not painful.   She reports she notices blood in the poop  Has a BM 2-3 per day, soft and easy to pass  No mucous in the stool     Interval hx 2024  She reports her rash is worse for the past 5 days  Anal fissure/bleeding better, used diltiazem cream  Today she states about once a week she will go urinate and when she goes to wipe there is stool at the anus      Review of patient's allergies indicates:   Allergen Reactions    Docusate Hives    Vancomycin Hives    Morphine     Penicillins        Past Medical History:   Diagnosis Date    Asthma     H/O:      Ovarian cyst      Past Surgical History:   Procedure Laterality Date     SECTION       Family History   Problem Relation Name Age of Onset    Breast cancer Mother      Asthma Father      Cancer Neg Hx      Colon cancer Neg Hx      Diabetes Neg Hx      Eclampsia Neg Hx      Hypertension Neg Hx      Miscarriages / Stillbirths Neg Hx      Ovarian cancer Neg Hx       labor Neg Hx      Stroke Neg Hx       Social History     Tobacco Use    Smoking status: Never    Smokeless tobacco: Never   Substance Use Topics    Alcohol use: Never    Drug use: Never        Review of Systems:  ROS    OBJECTIVE:     Vital Signs (Most Recent)  /76 (BP Location: Right arm, Patient Position: Sitting, BP  Method: Large (Automatic))   Pulse 77   Wt 70.9 kg (156 lb 4.9 oz)   BMI 30.53 kg/m²     Physical Exam:  General: Patient Refused female in no distress   Neuro: Alert and oriented to person, place, and time.  Moves all extremities.     HEENT: No icterus.  Trachea midline  Respiratory: Respirations are even and unlabored, no cough or audible wheezing  Skin: Warm dry and intact, No visible rashes, no jaundice    Labs reviewed today:  Lab Results   Component Value Date    WBC 8.59 05/30/2023    HGB 12.7 05/30/2023    HCT 41.1 05/30/2023     05/30/2023    ALT 12 05/30/2023    AST 17 05/30/2023     05/30/2023    K 4.0 05/30/2023     05/30/2023    CREATININE 1.0 05/30/2023    BUN 19 05/30/2023    CO2 24 05/30/2023       Anorectal Exam:    Anal Skin:  perineal superficial breaks in skin, no obvious blisters or lesions, no signs of infection    Digital Rectal Exam:  .    Patient tolerated the procedure well.       ASSESSMENT/PLAN:     Diagnoses and all orders for this visit:    Anal irritation    28 yo F here for anal irritation. Chronic. On exam today there are superficial breaks in the skin  I offered to test for hsv as this maybe was a concern for her, but it is unclear if she is truly concerned. However I do not see herpes today  She is concerned it is some sort of infection, derm follows.  She has tried steroids, antifungals and calmoseptine.   For now id like to test my assumption of stool leakage causing skin breakdown. For now fiber and calmoseptine. F/u w MD in the future      Annabel Ramos, FNP-C  Colon and Rectal Surgery

## 2024-08-02 ENCOUNTER — OFFICE VISIT (OUTPATIENT)
Dept: URGENT CARE | Facility: CLINIC | Age: 29
End: 2024-08-02
Payer: COMMERCIAL

## 2024-08-02 VITALS
SYSTOLIC BLOOD PRESSURE: 97 MMHG | HEART RATE: 82 BPM | BODY MASS INDEX: 30.63 KG/M2 | HEIGHT: 60 IN | RESPIRATION RATE: 16 BRPM | WEIGHT: 156 LBS | OXYGEN SATURATION: 97 % | DIASTOLIC BLOOD PRESSURE: 60 MMHG

## 2024-08-02 DIAGNOSIS — S43.402D SPRAIN OF LEFT SHOULDER, UNSPECIFIED SHOULDER SPRAIN TYPE, SUBSEQUENT ENCOUNTER: Primary | ICD-10-CM

## 2024-08-02 DIAGNOSIS — Z02.6 ENCOUNTER RELATED TO WORKER'S COMPENSATION CLAIM: ICD-10-CM
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
## 2024-08-02 PROCEDURE — 99214 OFFICE O/P EST MOD 30 MIN: CPT | Mod: S$GLB,,, | Performed by: SURGERY

## 2024-08-12 ENCOUNTER — HOSPITAL ENCOUNTER (OUTPATIENT)
Dept: RADIOLOGY | Facility: HOSPITAL | Age: 29
Discharge: HOME | End: 2024-08-12
Attending: SURGERY
Payer: COMMERCIAL
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
## 2024-08-12 DIAGNOSIS — Z02.6 ENCOUNTER RELATED TO WORKER'S COMPENSATION CLAIM: ICD-10-CM

## 2024-08-12 DIAGNOSIS — S43.402D SPRAIN OF LEFT SHOULDER, UNSPECIFIED SHOULDER SPRAIN TYPE, SUBSEQUENT ENCOUNTER: ICD-10-CM

## 2024-08-12 PROCEDURE — 73221 MRI JOINT UPR EXTREM W/O DYE: CPT | Mod: TC,LT

## 2024-08-15 ENCOUNTER — CLINICAL SUPPORT (OUTPATIENT)
Dept: REHABILITATION | Facility: HOSPITAL | Age: 29
End: 2024-08-15
Payer: COMMERCIAL

## 2024-08-15 DIAGNOSIS — M25.612 DECREASED RANGE OF MOTION OF LEFT SHOULDER: Primary | ICD-10-CM

## 2024-08-15 PROCEDURE — 97110 THERAPEUTIC EXERCISES: CPT

## 2024-08-15 PROCEDURE — 97112 NEUROMUSCULAR REEDUCATION: CPT

## 2024-08-15 PROCEDURE — 97140 MANUAL THERAPY 1/> REGIONS: CPT

## 2024-08-15 NOTE — PROGRESS NOTES
"[facility]   Physical Therapy Treatment Note      Name: Cheryl Mayer  [facility] Number: [id number]    Therapy Diagnosis:   Encounter Diagnosis   Name Primary?    Decreased range of motion of left shoulder Yes       Physician: Nick Zelaya MD    Visit Date: 8/15/2024    Physician Orders: PT Eval and Treat   Medical Diagnosis from Referral: Encounter related to worker's compensation claim [Z02.6], Sprain of left shoulder, unspecified shoulder sprain type, subsequent encounter [S43.402D], Work related injury [Y99.0]   Evaluation Date: 7/11/2024  Authorization Period Expiration: 7/5/2025  Plan of Care Expiration: 9/30/2024  Progress Note Due: 8/11/2024  Date of Surgery: N/A  Visit # / Visits authorized:  5/12   FOTO: 1/ 3     Precautions: Standard      Time In: 5:02 PM  Time Out: 6:00 PM  Total Billable Time: 58 minutes    Subjective     Patient reports: shoulder is feeling a little better. She is still doing light duty at work and limiting her lifting at home. Feels like the shoulder pain in on the top still and somewhat behind the shoulder blade/scapula (gestures to left medial scapular border).     She was compliant with home exercise program.    Response to previous treatment: slight improvement   Functional change: slightly improved lifting tolerance or motion    Pain: 2/10  Location: left superior/posterior shoulder     Objective      Objective Measures updated at progress report unless specified.     Cervical spine ROM:  No pain into left shoulder or upper trap region with active range of motion cardinal planes      Treatment     Cheryl received the treatments listed below:      therapeutic exercises to develop strength, endurance, and ROM for 09 minutes including:    Seated thoracic extension 2 x 20 with 3" hold  Sidelying open books 2 x 20     manual therapy techniques: Joint mobilizations and Soft tissue Mobilization were applied to the: left shoulder, AC joint for 11  minutes, " "including:    Scapular upward rotation mobs in sidelying  Posterior/inferior mobs Left GH, Grades II/III  Seated assisted PA mobs of upper thoracic spine, 20 reps     Not performed today:  Prone thoracic PA mobs, grades III/IV  Posterior centration with IR submax isometrics, 5 x 10 sec holds   Rhythmic stabilization holds flexion at 90, 100 degr    neuromuscular re-education activities to improve: Coordination, Kinesthetic, Sense, Proprioception, and Posture for 38 minutes. The following activities were included:    IR/ER at wall submax isometrics, 10x each with 5" holds   Flexion isometrics into wall, 5 x 10 sec holds  Serratus punches at 90 and 100 degr 3#, 3 x 10 with 3" hold  Upper trap shrugs level 2, 10 x 10 sec holds   Prone scapular retraction, cueing for form, 2 x 12 with 3" hold  Serratus wall slides 2 x 10 with modified range     Not performed today:  Prone mid trap level 2, 3 x 10   Abduction isometrics at multiple angles, 5 x 5 sec holds     therapeutic activities to improve functional performance for 00  minutes, including:        Patient Education and Home Exercises       Education provided:   - HEP     Written Home Exercises Provided: Patient instructed to cont prior HEP. Exercises were reviewed and Cheryl was able to demonstrate them prior to the end of the session.  Cheryl demonstrated good  understanding of the education provided. See Electronic Medical Record under Patient Instructions for exercises provided during therapy sessions    Assessment     Cheryl returns with slightly improved symptoms of left shoulder pain with full flexion. Continues to have muscular strain symptoms in the medial scapular border most noted with internal rotation maximal strength efforts. Educated to remain in pain-free motion and submaximal strengthening efforts to assist healing process. Cervical spine motion did not reproduce shoulder/scapular pain today. Patient tolerated isometrics and scapular activation better " during this session, but still has periscapular and RTC limited strength of the left shoulder limiting her ability to reach overhead or tolerate lifting without pain.     Cheryl Is progressing well towards her goals.     Patient prognosis is Good.     Patient will continue to benefit from skilled outpatient physical therapy to address the deficits listed in the problem list box on initial evaluation, provide pt/family education and to maximize pt's level of independence in the home and community environment.     Patient's spiritual, cultural and educational needs considered and pt agreeable to plan of care and goals.     Anticipated barriers to physical therapy: none    Goals:   Short Term Goals:  4 weeks  1.Report decreased left shoulder pain < / =  2/10 to increase tolerance for progressing exercises above 90 degrees of flexion. - Progressing, Not met   2. Increase PROM full and pain-free into flexion and abduction in order to improve ability to progress exercises. - Progressing, Not met   3. Increased strength by 1/3 MMT grade in Left shoulder RTC and periscapular musculature to increase tolerance for ADL and work activities. - Progressing, Not met   4. Pt to tolerate HEP to improve ROM and independence with ADL's - Progressing, Not met      Long Term Goals: 10 weeks  1.Report decreased left shoulder pain  </= 1/10  to increase tolerance for progressing resistance exercises in overhead positions. - Progressing, Not met   2.Increase AROM to full and pain-free to show good tolerance for overhead reaching.  - Progressing, Not met   3.Increase strength to >/= 4/5 in Left shoulder RTC and periscapular musculature to increase tolerance for ADL and work activities. - Progressing, Not met   4. Pt goal: improve pain in the left shoulder so that she can return to full work/lifting status and be able to tolerate ADLs; be able to  her 3 y/o child without pain in shoulder. - Progressing, Not met   5. Pt will have  improved score of >/= 68% on FOTO shoulder in order to demonstrate true functional improvement. - Progressing, Not met     Plan     Plan of care Certification: 7/11/2024 to 9/30/2024.     Outpatient Physical Therapy 2 times weekly for 10 weeks to include the following interventions: Manual Therapy, Moist Heat/ Ice, Neuromuscular Re-ed, Patient Education, Therapeutic Activities, Therapeutic Exercise, and Ultrasound.     Berto King, PT

## 2024-08-16 ENCOUNTER — PATIENT MESSAGE (OUTPATIENT)
Dept: URGENT CARE | Facility: CLINIC | Age: 29
End: 2024-08-16
Payer: COMMERCIAL

## 2024-08-16 ENCOUNTER — OFFICE VISIT (OUTPATIENT)
Dept: URGENT CARE | Facility: CLINIC | Age: 29
End: 2024-08-16
Payer: COMMERCIAL

## 2024-08-16 DIAGNOSIS — Z02.6 ENCOUNTER RELATED TO WORKER'S COMPENSATION CLAIM: ICD-10-CM

## 2024-08-16 DIAGNOSIS — S43.402D SPRAIN OF LEFT SHOULDER, UNSPECIFIED SHOULDER SPRAIN TYPE, SUBSEQUENT ENCOUNTER: Primary | ICD-10-CM

## 2024-08-16 NOTE — LETTER
[facility] - [place] [address]  Phone: [phone]  Fax: [phone]  [facility]: [email or web address]    Pt Name: Cheryl Mayer  Injury Date: 06/21/2024   Employee ID: [id number] Date of Treatment: 08/16/2024   Company: [employer]      Appointment Time: 12:45 PM Arrived: 1:00 pm   Provider: Nick Zelaya MD Time Out:1:11 pm     Office Treatment:   1. Sprain of left shoulder, unspecified shoulder sprain type, subsequent encounter    2. Encounter related to worker's compensation claim          Patient Instructions: Continue Physical Therapy        Restrictions:  (No lifting/pushing/pulling greater than 35 lbs)     Return Appointment: 9/13/2024 @ 3:45 pm    KW

## 2024-08-16 NOTE — PROGRESS NOTES
Subjective:      Patient ID: Cheryl Mayer is a 29 y.o. female.    Chief Complaint: Shoulder Pain  The patient location is: [place]  The chief complaint leading to consultation is:   Chief Complaint   Patient presents with    Shoulder Pain       Visit type: audiovisual    Face to Face time with patient: 10  I spent a total of 30 minutes on the day of the visit.    minutes of total time spent on the encounter, which includes face to face time and non-face to face time preparing to see the patient (eg, review of tests), Obtaining and/or reviewing separately obtained history, Documenting clinical information in the electronic or other health record, Independently interpreting results (not separately reported) and communicating results to the patient/family/caregiver, or Care coordination (not separately reported).     Each patient to whom he or she provides medical services by telemedicine is:  (1) informed of the relationship between the physician and patient and the respective role of any other health care provider with respect to management of the patient; and (2) notified that he or she may decline to receive medical services by telemedicine and may withdraw from such care at any time.      LESLEE High MD note: She completed the MRI. She still has pain with reaching backward and has noticed popping of her shoulder. She occasionally uses tylenol for pain. 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    Objective:     Physical Exam  Vitals reviewed: Deferred due to telemedicine visit.   Constitutional:       General: She is not in acute distress.     Appearance: She is not ill-appearing.   HENT:      Head: Normocephalic.   Eyes:      Conjunctiva/sclera: Conjunctivae normal.   Pulmonary:      Effort: Pulmonary effort is normal. No respiratory distress.   Musculoskeletal:         General: Normal range of motion.      Left shoulder: Normal range of motion.      Comments: FAROM of the  left shoulder with report of feeling something pop when extend backward.    Skin:     General: Skin is warm.      Coloration: Skin is not pale.   Neurological:      General: No focal deficit present.      Mental Status: She is alert and oriented to person, place, and time.      GCS: GCS eye subscore is 4. GCS verbal subscore is 5. GCS motor subscore is 6.      Motor: Motor function is intact.      Coordination: Coordination is intact.   Psychiatric:         Attention and Perception: Attention normal.         Mood and Affect: Mood normal.         Speech: Speech normal.         Behavior: Behavior normal. Behavior is cooperative.         Thought Content: Thought content normal.            Imaging  MRI Shoulder Without Contrast Left    Result Date: 8/13/2024  EXAMINATION: MRI SHOULDER WITHOUT CONTRAST LEFT CLINICAL HISTORY: Shoulder trauma, labral tear suspected, xray done;Shoulder trauma, rotator cuff tear suspected, xray done;  Encounter for examination for insurance purposes TECHNIQUE: MRI left shoulder performed without contrast per routine protocol. COMPARISON: Radiographs 06/22/2024 FINDINGS: Rotator cuff: Supraspinatus, infraspinatus, teres minor, and subscapularis tendons are intact.  Muscle bulk is maintained. Labrum: Unremarkable non arthrographic evaluation. Biceps: Long head biceps tendon is intact. Bone: There is no fracture. Bone marrow signal is unremarkable. Acromioclavicular joint: The AC joint is unremarkable. Cartilage: Articular cartilage of the glenohumeral joint is preserved. Miscellaneous: No additional findings.     Unremarkable examination. Electronically signed by: Devonte Ngyuen MD Date:    08/13/2024 Time:    09:07     Assessment:      1. Sprain of left shoulder, unspecified shoulder sprain type, subsequent encounter    2. Encounter related to worker's compensation claim      Plan:     I reviewed the MRI results with MsJenny PardoMayer, no abnormalities found on MRI, no inflammation noted. She would  benefit from her remaining PT sessions focusing on work conditioning and simulation of her work activities to address any remaining deficit. I have sent message to therapist inquiring of such. She will follow-up in 4 weeks closer to completion of PT. I anticipate a return to regular duty and discharge from [facility] at that time.          Diagnoses and plan discussed with the patient, as well as the expected course and duration of symptoms. Risks and benefits of any medication prescribed during this visit was explained, verbal instructions on use given. Clinic/Emergency department return precautions were given, can return to [facility] before scheduled follow-up appointment if notes worsening/aggravation of symptoms. All questions and concerns were addressed prior to discharge. Plan was developed with active input from the patient and they verbalized understanding of and agreement with the POC.  [facility] was informed of any referrals and relevant orders.  Note was dictated with voice recognition software, please excuse any grammatical errors.    I spent a total of 30 minutes on the day of the visit.  This includes face to face time and non-face to face time preparing to see the patient (e.g. review of medical record), obtaining and/or reviewing separately obtained history, documenting clinical information in the electronic or other health record, independently interpreting results and communicating results to the patient/family/caregiver, or care coordinator.    Patient Instructions: Continue Physical Therapy   Restrictions:  (No lifting/pushing/pulling greater than 35 lbs)  Follow up in about 4 weeks (around 9/13/2024) for Schedule for [place].

## 2024-08-22 ENCOUNTER — CLINICAL SUPPORT (OUTPATIENT)
Dept: REHABILITATION | Facility: HOSPITAL | Age: 29
End: 2024-08-22
Payer: COMMERCIAL

## 2024-08-22 DIAGNOSIS — M25.612 DECREASED RANGE OF MOTION OF LEFT SHOULDER: Primary | ICD-10-CM

## 2024-08-22 PROCEDURE — 97112 NEUROMUSCULAR REEDUCATION: CPT

## 2024-08-22 PROCEDURE — 97140 MANUAL THERAPY 1/> REGIONS: CPT

## 2024-08-22 PROCEDURE — 97110 THERAPEUTIC EXERCISES: CPT

## 2024-08-22 NOTE — PROGRESS NOTES
[facility]   Physical Therapy Treatment Note and Progress Note      Name: Cheryl Mayer  [facility] Number: [id number]    Therapy Diagnosis:   Encounter Diagnosis   Name Primary?    Decreased range of motion of left shoulder Yes         Physician: Nick Zelaya MD    Visit Date: 8/22/2024    Physician Orders: PT Eval and Treat   Medical Diagnosis from Referral: Encounter related to worker's compensation claim [Z02.6], Sprain of left shoulder, unspecified shoulder sprain type, subsequent encounter [S43.402D], Work related injury [Y99.0]   Evaluation Date: 7/11/2024  Authorization Period Expiration: 7/5/2025  Plan of Care Expiration: 9/30/2024  Progress Note Due: 9/22/2024    Date of Surgery: N/A  Visit # / Visits authorized:  6/12   FOTO: 1/ 3     Precautions: Standard      Time In: 4:50 PM  Time Out: 5:50 PM  Total Billable Time: 60 minutes    Subjective     Patient reports: shoulder is still feeling some pain at the top near AC joint.     She was compliant with home exercise program.    Response to previous treatment: slight improvement   Functional change: slightly improved lifting tolerance or motion    Pain: 2/10  Location: left superior/posterior shoulder     Objective      Re-assessment (8/22/2024)       Cervical spine ROM:  Mild pain into left shoulder/lateral to T2/T3 with active extension   No pain with flexion, sidebending, rotation        Passive Range of Motion:   Shoulder Right Left   Flexion 170 165 *   Abduction 170 160   ER at 0 70 65   ER at 90 110 100   IR 45 40      Active Range of Motion:   Shoulder Right Left   Flexion 170 165 *   Abduction 170 160   ER at 0 70 65   IR 40 35    Reach behind head Full, pain-free to T2 Limited, able to reach to back of occiput   Reach behind back  Full, pain-free    Sacrum/L5, painful posterior/superior shoulder    *denotes pain with motion      Strength:  Shoulder Right Left   Flexion 4+/5 4-/5    Abduction 4+/5 4-/5    ER 4/5 4-/5   "  IR 4+/5 4-/5          Joint Mobility: left GH joint no limitations into inferior direction; slightly limited posteriorly; unable to fully assess due to guarding      Palpation: TTP scapular notch and near AC joint      Treatment     Cheryl received the treatments listed below:      therapeutic exercises to develop strength, endurance, and ROM for 11 minutes including:    Re-assessment noted above   Seated thoracic extension 2 x 20 with 3" hold  Sidelying open books, R rotation only 2 x 20     manual therapy techniques: Joint mobilizations and Soft tissue Mobilization were applied to the: left shoulder, AC joint for 09  minutes, including:    Scapular upward rotation mobs in sidelying  Posterior/inferior mobs Left GH, Grades II/III  Seated assisted PA mobs of upper thoracic spine  Posterior to Anterior thoracic mobs/CT junction mobs prone, grades II/III    Not performed today:  Prone thoracic PA mobs, grades III/IV  Posterior centration with IR submax isometrics, 5 x 10 sec holds   Rhythmic stabilization holds flexion at 90, 100 degr    neuromuscular re-education activities to improve: Coordination, Kinesthetic, Sense, Proprioception, and Posture for 40 minutes. The following activities were included:    ER at wall submax isometrics, 5x each with 10" holds   Extension isometrics into wall, 5 x 10 sec holds  Serratus punches at 110 degr with UE supported on table, 3 x 12  Upper trap shrugs level 2, 5 x 10 sec holds   Prone scapular retraction, cueing for form, 2 x 12 with 3" hold    Not performed today:  Prone mid trap level 2, 3 x 10   Abduction isometrics at multiple angles, 5 x 5 sec holds     therapeutic activities to improve functional performance for 00  minutes, including:        Patient Education and Home Exercises       Education provided:   - HEP     Written Home Exercises Provided: Patient instructed to cont prior HEP. Exercises were reviewed and Cheryl was able to demonstrate them prior to the end of the " session.  Cheryl demonstrated good  understanding of the education provided. See Electronic Medical Record under Patient Instructions for exercises provided during therapy sessions    Assessment     Cheryl presents for her re-assessment today and has completed 6 visit of therapy with some noted improvement in her left shoulder range of motion as well as improved tolerance to strength testing without pain. She has been responding well within sessions for improvement of cervical spine motion as well as shoulder range of motion tolerance. Continues to work on improving periscapular proprioception and endurance to support her posture with overhead reaching. She remains a good candidate as we begin to move into a more overhead range of motion and strengthening to assist with her work demands.     Cheryl Is progressing well towards her goals.     Patient prognosis is Good.     Patient will continue to benefit from skilled outpatient physical therapy to address the deficits listed in the problem list box on initial evaluation, provide pt/family education and to maximize pt's level of independence in the home and community environment.     Patient's spiritual, cultural and educational needs considered and pt agreeable to plan of care and goals.     Anticipated barriers to physical therapy: none    Goals:   Short Term Goals:  4 weeks  1.Report decreased left shoulder pain < / =  2/10 to increase tolerance for progressing exercises above 90 degrees of flexion. - met   2. Increase PROM full and pain-free into flexion and abduction in order to improve ability to progress exercises. - Partially met    3. Increased strength by 1/3 MMT grade in Left shoulder RTC and periscapular musculature to increase tolerance for ADL and work activities. - met     4. Pt to tolerate HEP to improve ROM and independence with ADL's - met      Long Term Goals: 10 weeks  1.Report decreased left shoulder pain  </= 1/10  to increase tolerance for  progressing resistance exercises in overhead positions. - Progressing, Not met   2.Increase AROM to full and pain-free to show good tolerance for overhead reaching.  - Progressing, Not met   3.Increase strength to >/= 4/5 in Left shoulder RTC and periscapular musculature to increase tolerance for ADL and work activities. - Progressing, Not met   4. Pt goal: improve pain in the left shoulder so that she can return to full work/lifting status and be able to tolerate ADLs; be able to  her 5 y/o child without pain in shoulder. - Progressing, Not met   5. Pt will have improved score of >/= 68% on FOTO shoulder in order to demonstrate true functional improvement. - Progressing, Not met     Plan     Plan of care Certification: 7/11/2024 to 9/30/2024.     Outpatient Physical Therapy 2 times weekly for 10 weeks to include the following interventions: Manual Therapy, Moist Heat/ Ice, Neuromuscular Re-ed, Patient Education, Therapeutic Activities, Therapeutic Exercise, and Ultrasound.     Berto King, PT

## 2024-09-05 ENCOUNTER — CLINICAL SUPPORT (OUTPATIENT)
Dept: REHABILITATION | Facility: HOSPITAL | Age: 29
End: 2024-09-05
Payer: COMMERCIAL

## 2024-09-05 DIAGNOSIS — M25.612 DECREASED RANGE OF MOTION OF LEFT SHOULDER: Primary | ICD-10-CM

## 2024-09-05 PROCEDURE — 97112 NEUROMUSCULAR REEDUCATION: CPT

## 2024-09-05 PROCEDURE — 97110 THERAPEUTIC EXERCISES: CPT

## 2024-09-05 PROCEDURE — 97140 MANUAL THERAPY 1/> REGIONS: CPT

## 2024-09-05 NOTE — PROGRESS NOTES
"[facility]   Physical Therapy Treatment Note      Name: Cheryl Mayer  Clinic Number: [id number]    Therapy Diagnosis:   Encounter Diagnosis   Name Primary?    Decreased range of motion of left shoulder Yes         Physician: Nick Zelaya MD    Visit Date: 9/5/2024    Physician Orders: PT Eval and Treat   Medical Diagnosis from Referral: Encounter related to worker's compensation claim [Z02.6], Sprain of left shoulder, unspecified shoulder sprain type, subsequent encounter [S43.402D], Work related injury [Y99.0]   Evaluation Date: 7/11/2024  Authorization Period Expiration: 7/5/2025  Plan of Care Expiration: 9/30/2024  Progress Note Due: 9/22/2024    Date of Surgery: N/A  Visit # / Visits authorized:  7/12   FOTO: 1/ 3     Precautions: Standard      Time In: 5:01 PM  Time Out: 6:00 PM  Total Billable Time: 55 minutes    Subjective     Patient reports: she is feeling "much better" overall but still has some pain on the left side (gestures to left upper trap region).     She was compliant with home exercise program.    Response to previous treatment: slight improvement   Functional change: slightly improved lifting tolerance or motion    Pain: 2/10  Location: left superior/posterior shoulder     Objective      Cervical spine motion: (+) pain with Left rotation, Left sidebend, Extension     Shoulder range of motion:   Full flexion, but pain posterior left shoulder/upper trap region at end-range      Treatment     Cheryl received the treatments listed below:      therapeutic exercises to develop strength, endurance, and ROM for 14 minutes including:    Seated thoracic extension 2 x 20 with 3" hold  Sidelying open books, 2 x 20   HEP update     manual therapy techniques: Joint mobilizations and Soft tissue Mobilization were applied to the: left shoulder, AC joint for 31 minutes, including:    C1-C2 METs to Left rotation, 4 x 6 sec holds   Cervical spine C4-C5 Side glides, grades " "II/III  Left first rib mobs, grades II/III  MWM Left rotation at T1-T2, 2 x 15 reps   Posterior to Anterior thoracic mobs/CT junction mobs prone, grades II/III    Not performed today:  Posterior/inferior mobs Left GH, Grades II/III  Seated assisted PA mobs of upper thoracic spine  Posterior centration with IR submax isometrics, 5 x 10 sec holds   Rhythmic stabilization holds flexion at 90, 100 degr    neuromuscular re-education activities to improve: Coordination, Kinesthetic, Sense, Proprioception, and Posture for 10 minutes. The following activities were included:    Shoulder Abduction at wall submax isometrics, 5x each with 10" holds   Self first rib mobs, 2 x 20     Not performed today:  Extension isometrics into wall, 5 x 10 sec holds  Serratus punches at 110 degr with UE supported on table, 3 x 12  Upper trap shrugs level 2, 5 x 10 sec holds   Prone mid trap level 2, 3 x 10       therapeutic activities to improve functional performance for 00  minutes, including:      Patient Education and Home Exercises       Education provided:   - HEP     Written Home Exercises Provided: Patient instructed to cont prior HEP. Exercises were reviewed and Cheryl was able to demonstrate them prior to the end of the session.  Cheryl demonstrated good  understanding of the education provided. See Electronic Medical Record under Patient Instructions for exercises provided during therapy sessions    Assessment     Cheryl presents with some left upper trap region pain referral signs during cervical spine facet closing pattern of motion. Manual interventions for opening during left sidebend and C1-C2 rotation helped to reduce sidebend and rotation symptoms while CT junction and Thoracic PA mobilizations helped to reduce pain felt with extension. Patient also found to have left sided elevated first rib, which was also addressed during this session and added to HEP. She responded well to isometrics at submaximal effort for reducing " shoulder elevation as well. Patient will continue to work on postural and strength deficits to meet remainder of her goals.      Cheryl Is progressing well towards her goals.     Patient prognosis is Good.     Patient will continue to benefit from skilled outpatient physical therapy to address the deficits listed in the problem list box on initial evaluation, provide pt/family education and to maximize pt's level of independence in the home and community environment.     Patient's spiritual, cultural and educational needs considered and pt agreeable to plan of care and goals.     Anticipated barriers to physical therapy: none    Goals:   Short Term Goals:  4 weeks  1.Report decreased left shoulder pain < / =  2/10 to increase tolerance for progressing exercises above 90 degrees of flexion. - met   2. Increase PROM full and pain-free into flexion and abduction in order to improve ability to progress exercises. - Partially met    3. Increased strength by 1/3 MMT grade in Left shoulder RTC and periscapular musculature to increase tolerance for ADL and work activities. - met     4. Pt to tolerate HEP to improve ROM and independence with ADL's - met      Long Term Goals: 10 weeks  1.Report decreased left shoulder pain  </= 1/10  to increase tolerance for progressing resistance exercises in overhead positions. - Progressing, Not met   2.Increase AROM to full and pain-free to show good tolerance for overhead reaching.  - Progressing, Not met   3.Increase strength to >/= 4/5 in Left shoulder RTC and periscapular musculature to increase tolerance for ADL and work activities. - Progressing, Not met   4. Pt goal: improve pain in the left shoulder so that she can return to full work/lifting status and be able to tolerate ADLs; be able to  her 3 y/o child without pain in shoulder. - Progressing, Not met   5. Pt will have improved score of >/= 68% on FOTO shoulder in order to demonstrate true functional improvement. -  Progressing, Not met     Plan     Plan of care Certification: 7/11/2024 to 9/30/2024.     Outpatient Physical Therapy 2 times weekly for 10 weeks to include the following interventions: Manual Therapy, Moist Heat/ Ice, Neuromuscular Re-ed, Patient Education, Therapeutic Activities, Therapeutic Exercise, and Ultrasound.     Berto King, PT          DISCHARGE

## 2024-09-16 ENCOUNTER — OFFICE VISIT (OUTPATIENT)
Dept: URGENT CARE | Facility: CLINIC | Age: 29
End: 2024-09-16
Payer: COMMERCIAL

## 2024-09-16 DIAGNOSIS — Z02.6 ENCOUNTER RELATED TO WORKER'S COMPENSATION CLAIM: ICD-10-CM
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
## 2024-09-16 DIAGNOSIS — S43.402D SPRAIN OF LEFT SHOULDER, UNSPECIFIED SHOULDER SPRAIN TYPE, SUBSEQUENT ENCOUNTER: Primary | ICD-10-CM

## 2024-09-16 PROCEDURE — G2211 COMPLEX E/M VISIT ADD ON: HCPCS | Mod: 95,,, | Performed by: SURGERY

## 2024-09-16 PROCEDURE — 99214 OFFICE O/P EST MOD 30 MIN: CPT | Mod: 95,,, | Performed by: SURGERY

## 2024-09-16 NOTE — LETTER
[facility]  [address] [address]  Phone: [phone]  Fax: [phone]  [employer]: [id number]    Pt Name: Cheryl Mayer  Injury Date: 06/21/2024   Employee ID: [id number] Date of Treatment: 09/16/2024   Company: [employer]      Appointment Time: 03:15 PM Arrived: 3:15 PM    Provider: Nick Zelaya MD Time Out:3:47 PM      Office Treatment:   1. Sprain of left shoulder, unspecified shoulder sprain type, subsequent encounter    2. Encounter related to worker's compensation claim    3. Work related injury          Patient Instructions: Continue Physical Therapy      Restrictions: No lifting/pushing/pulling more than 50 lbs     Return Appointment: 10/15/2024 at 4:00 PM [facility] [place]

## 2024-09-16 NOTE — PROGRESS NOTES
Subjective:      Patient ID: Cheryl Mayer is a 29 y.o. female.    Chief Complaint: No chief complaint on file.  The patient location is: [place]  The chief complaint leading to consultation is: No chief complaint on file.      Visit type: audiovisual    Face to Face time with patient: 10  I spent a total of 30 minutes on the day of the visit.    minutes of total time spent on the encounter, which includes face to face time and non-face to face time preparing to see the patient (eg, review of tests), Obtaining and/or reviewing separately obtained history, Documenting clinical information in the electronic or other health record, Independently interpreting results (not separately reported) and communicating results to the patient/family/caregiver, or Care coordination (not separately reported).     Each patient to whom he or she provides medical services by telemedicine is:  (1) informed of the relationship between the physician and patient and the respective role of any other health care provider with respect to management of the patient; and (2) notified that he or she may decline to receive medical services by telemedicine and may withdraw from such care at any time.    LESLEE High MD note: Cheryl Mayer is a 29 y.o. presenting for follow-up of left shoulder injury. 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     Objective:     Physical Exam  Vitals reviewed: Deferred due to telemedicine visit.   Constitutional:       General: She is not in acute distress.     Appearance: She is not ill-appearing.   HENT:      Head: Normocephalic.   Eyes:      Conjunctiva/sclera: Conjunctivae normal.   Pulmonary:      Effort: Pulmonary effort is normal. No respiratory distress.   Musculoskeletal:         General: Normal range of motion.      Left shoulder: Normal range of motion.      Comments: FAROM of the left  shoulder with report of feeling something pop when extend backward.    Skin:     General: Skin is warm.      Coloration: Skin is not pale.   Neurological:      General: No focal deficit present.      Mental Status: She is alert and oriented to person, place, and time.      GCS: GCS eye subscore is 4. GCS verbal subscore is 5. GCS motor subscore is 6.      Motor: Motor function is intact.      Coordination: Coordination is intact.   Psychiatric:         Attention and Perception: Attention normal.         Mood and Affect: Mood normal.         Speech: Speech normal.         Behavior: Behavior normal. Behavior is cooperative.         Thought Content: Thought content normal.        Assessment:      1. Sprain of left shoulder, unspecified shoulder sprain type, subsequent encounter    2. Encounter related to worker's compensation claim      Plan:     I reviewed the PT notes related to this injury, met most of her short term goals, but none of the long term goals. She would benefit from work conditioning program to get her back to functioning at her PLOF and to ensure correct body mechanics going forward, referral sent. Follow-up near completion of program.         Diagnoses and plan discussed with the patient, as well as the expected course and duration of symptoms. Risks and benefits of any medication prescribed during this visit was explained, verbal instructions on use given. Clinic/Emergency department return precautions were given, can return to [facility] before scheduled follow-up appointment if notes worsening/aggravation of symptoms. All questions and concerns were addressed prior to discharge. Plan was developed with active input from the patient and they verbalized understanding of and agreement with the POC.  [facility] was informed of any referrals and relevant orders.  Note was dictated with voice recognition software, please excuse any grammatical errors.    I spent a total of 30 minutes on the day of the  visit.  This includes face to face time and non-face to face time preparing to see the patient (e.g. review of medical record), obtaining and/or reviewing separately obtained history, documenting clinical information in the electronic or other health record, independently interpreting results and communicating results to the patient/family/caregiver, or care coordinator.    Patient Instructions: Continue Physical Therapy   Restrictions: No lifting/pushing/pulling more than 50 lbs  Follow up in about 29 days (around 10/15/2024).

## 2024-10-15 ENCOUNTER — OFFICE VISIT (OUTPATIENT)
Dept: URGENT CARE | Facility: CLINIC | Age: 29
End: 2024-10-15
Payer: COMMERCIAL

## 2024-10-15 VITALS
OXYGEN SATURATION: 99 % | BODY MASS INDEX: 30.63 KG/M2 | DIASTOLIC BLOOD PRESSURE: 67 MMHG | HEART RATE: 82 BPM | SYSTOLIC BLOOD PRESSURE: 101 MMHG | WEIGHT: 156 LBS | HEIGHT: 60 IN | RESPIRATION RATE: 18 BRPM

## 2024-10-15 DIAGNOSIS — S43.402D SPRAIN OF LEFT SHOULDER, UNSPECIFIED SHOULDER SPRAIN TYPE, SUBSEQUENT ENCOUNTER: Primary | ICD-10-CM

## 2024-10-15 DIAGNOSIS — Z02.6 ENCOUNTER RELATED TO WORKER'S COMPENSATION CLAIM: ICD-10-CM

## 2024-10-15 PROCEDURE — G2211 COMPLEX E/M VISIT ADD ON: HCPCS | Mod: S$GLB,,, | Performed by: SURGERY

## 2024-10-15 PROCEDURE — 99213 OFFICE O/P EST LOW 20 MIN: CPT | Mod: S$GLB,,, | Performed by: SURGERY

## 2024-10-15 NOTE — PROGRESS NOTES
Subjective:      Patient ID: Cheryl Mayer is a 29 y.o. female.    Chief Complaint: Shoulder Pain    Patient's place of employment - [employer]   Patient's job title - x-ray tech  Date of Injury - 06/21/2024  Body part injured - Left Shoulder   Current work status per last visit - Regular with restrictions   Improved, same, or worse - Same   Pain Scale right now (1-10) -  6/10  SB.             Musculoskeletal:  Positive for pain, trauma and abnormal ROM of joint. Negative for joint swelling, muscle cramps and muscle ache.   Skin:  Negative for erythema.   Neurological:  Positive for tingling (radiates from shoulder down to fingers). Negative for numbness.       See MA note above. Begin MD note:    Cheryl Mayer is a 29 y.o. presenting for follow-up of left shoulder. 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 Has not started second course of PT yet due to double scheduling with [facility] appointment today. She doesn't know when next she can get into PT. She is doing the HEP they provided without any issues.     Objective:     Physical Exam  Vitals and nursing note reviewed.   Constitutional:       General: She is not in acute distress.     Appearance: She is not ill-appearing.   HENT:      Head: Normocephalic.   Eyes:      Conjunctiva/sclera: Conjunctivae normal.   Pulmonary:      Effort: Pulmonary effort is normal. No respiratory distress.   Musculoskeletal:      Right shoulder: Normal.      Left shoulder: No tenderness or crepitus. Normal range of motion.      Cervical back: Normal range of motion and neck supple. No pain with movement or muscular tenderness.      Comments: FAROM of the shoulder.   Skin:     General: Skin is warm.      Coloration: Skin is not pale.      Findings: No erythema.   Neurological:      General: No focal deficit present.      Mental Status: She is alert and oriented to person,  place, and time.      GCS: GCS eye subscore is 4. GCS verbal subscore is 5. GCS motor subscore is 6.      Motor: Motor function is intact.      Coordination: Coordination is intact.   Psychiatric:         Attention and Perception: Attention normal.         Mood and Affect: Mood normal.         Speech: Speech normal.         Behavior: Behavior normal. Behavior is cooperative.         Thought Content: Thought content normal.        Assessment:      1. Sprain of left shoulder, unspecified shoulder sprain type, subsequent encounter    2. Encounter related to worker's compensation claim      Plan:     Awaiting initiation of second course of PT to focus on work simulation and increasing weights. Follow-up near completion of second course of PT.          Diagnoses and plan discussed with the patient, as well as the expected course and duration of symptoms. Risks and benefits of any medication prescribed during this visit was explained, verbal instructions on use given. Clinic/Emergency department return precautions were given, can return to [facility] before scheduled follow-up appointment if notes worsening/aggravation of symptoms. All questions and concerns were addressed prior to discharge. Plan was developed with active input from the patient and they verbalized understanding of and agreement with the POC.  [facility] [place] informed [place] any referrals and relevant orders.  Note was dictated with voice recognition software, please excuse any grammatical errors.    I spent a total of 20 minutes on the day of the visit.  This includes face to face time and non-face to face time preparing to see the patient (e.g. review of medical record), obtaining and/or reviewing separately obtained history, documenting clinical information in the electronic or other health record, independently interpreting results and communicating results to the patient/family/caregiver, or care coordinator.    Patient Instructions: Continue Physical Therapy    Restrictions: No lifting/pushing/pulling more than 50 lbs  Follow up in about 4 weeks (around 11/12/2024).

## 2024-10-15 NOTE — LETTER
[facility]  [address] [address]  Phone: [phone]  Fax: [phone]  [employer]: [email or web address]    Pt Name: Cheryl Mayer  Injury Date: 06/21/2024   Employee ID: [id number] Date of Treatment: 10/15/2024   Company: [employer]      Appointment Time: 03:45 PM Arrived: 4:12 PM   Provider: Nick Zelaya MD Time Out:4:55 PM     Office Treatment:   1. Sprain of left shoulder, unspecified shoulder sprain type, subsequent encounter    2. Encounter related to worker's compensation claim          Patient Instructions: Continue Physical Therapy    Restrictions: No lifting/pushing/pulling more than 50 lbs     Return Appointment: 11/12/2024 at 4:00 PM

## 2024-10-22 ENCOUNTER — CLINICAL SUPPORT (OUTPATIENT)
Dept: REHABILITATION | Facility: HOSPITAL | Age: 29
End: 2024-10-22
Payer: COMMERCIAL

## 2024-10-22 DIAGNOSIS — M25.612 DECREASED RANGE OF MOTION OF LEFT SHOULDER: Primary | ICD-10-CM

## 2024-10-22 PROCEDURE — 97110 THERAPEUTIC EXERCISES: CPT

## 2024-10-22 PROCEDURE — 97140 MANUAL THERAPY 1/> REGIONS: CPT

## 2024-10-22 PROCEDURE — 97112 NEUROMUSCULAR REEDUCATION: CPT

## 2024-10-22 PROCEDURE — 97164 PT RE-EVAL EST PLAN CARE: CPT

## 2024-10-22 NOTE — PROGRESS NOTES
"[facility]   Physical Therapy Treatment Note      Name: Cheryl Mayer  [facility] Number: [id number]    Therapy Diagnosis:   Encounter Diagnosis   Name Primary?    Decreased range of motion of left shoulder Yes           Physician: Nick Zelaya MD    Visit Date: 10/22/2024    Physician Orders: PT Eval and Treat   Medical Diagnosis from Referral: Encounter related to worker's compensation claim [Z02.6], Sprain of left shoulder, unspecified shoulder sprain type, subsequent encounter [S43.402D], Work related injury [Y99.0]   Evaluation Date: 7/11/2024  Authorization Period Expiration: 7/5/2025  Plan of Care Expiration: 9/30/2024  Progress Note Due: 9/22/2024    Date of Surgery: N/A  Visit # / Visits authorized:  7/12   FOTO: 1/ 3     Precautions: Standard      Time In: 5:01 PM  Time Out: *** PM  Total Billable Time: 55 minutes    Subjective     Patient reports: she is feeling "much better" overall but still has some pain on the left side (gestures to left upper trap region).     She was compliant with home exercise program.    Response to previous treatment: slight improvement   Functional change: slightly improved lifting tolerance or motion    Pain: 2/10  Location: left superior/posterior shoulder     Objective      Observation: alert, oriented, calm     Posture: right > Left scapular downward rotation; left > right scapular abduction      Passive Range of Motion:   Shoulder Right Left   Flexion 170 170 *   Abduction 170 170*   ER at 0 70 65   ER at 90 110 90 *   IR 45 35 *      Active Range of Motion:   Shoulder Right Left   Flexion 170 170 *    Abduction 170 170 *   ER at 0 70 65   IR 40 35 *   Reach behind head T2 T2*   Reach behind back  T11   T11*   *denotes pain with motion      Strength:  Shoulder Right Left   Flexion 4+/5 4/5 *   Abduction 4+/5 4/5 *   ER 4/5 4/5 *   IR 4+/5 4-/5 *   Serratus Anterior 3-/5 3/5   Middle Trap 3/5 3/5   Low Trap 3/5 3-/5         Special " "Tests:  Impingement Cluster:    Right Left   Hawkin's Kenndy - +   Painful Arc - +   Resisted ER - +      Rotator Cuff Tear    Right Left   Painful Arc - +   Drop Arm test - -   Resisted ER - +        SLAP:    Right Left   Biceps Load II - +   O'Presley's Test - +       Joint Mobility: empty end-feel left GH joint into inferior direction; slightly limited posteriorly but unable to fully assess due to guarding      Palpation: TTP sciatic notch and near AC joint      Sensation: intact light touch bilaterally       Treatment     Cheryl received the treatments listed below:      therapeutic exercises to develop strength, endurance, and ROM for *** minutes including:    Seated thoracic extension 2 x 20 with 3" hold  Sidelying open books, 2 x 20   HEP update     manual therapy techniques: Joint mobilizations and Soft tissue Mobilization were applied to the: left shoulder, AC joint for *** minutes, including:    C1-C2 METs to Left rotation, 4 x 6 sec holds   Cervical spine C4-C5 Side glides, grades II/III  Left first rib mobs, grades II/III  MWM Left rotation at T1-T2, 2 x 15 reps   Posterior to Anterior thoracic mobs/CT junction mobs prone, grades II/III    Not performed today:  Posterior/inferior mobs Left GH, Grades II/III  Seated assisted PA mobs of upper thoracic spine  Posterior centration with IR submax isometrics, 5 x 10 sec holds   Rhythmic stabilization holds flexion at 90, 100 degr    neuromuscular re-education activities to improve: Coordination, Kinesthetic, Sense, Proprioception, and Posture for *** minutes. The following activities were included:    Shoulder Abduction at wall submax isometrics, 5x each with 10" holds   Self first rib mobs, 2 x 20   Hand heel rocks 2x20   Prone middle trap level 1 2x15   Prone lower trap level 1 2x15   Prone upper trap level 1 2x15     Not performed today:  Extension isometrics into wall, 5 x 10 sec holds  Serratus punches at 110 degr with UE supported on table, 3 x 12  Upper " trap shrugs level 2, 5 x 10 sec holds   Prone mid trap level 2, 3 x 10       therapeutic activities to improve functional performance for 00  minutes, including:      Patient Education and Home Exercises       Education provided:   - HEP     Written Home Exercises Provided: Patient instructed to cont prior HEP. Exercises were reviewed and Cheryl was able to demonstrate them prior to the end of the session.  Cheryl demonstrated good  understanding of the education provided. See Electronic Medical Record under Patient Instructions for exercises provided during therapy sessions    Assessment     ***  Cheryl presents with some left upper trap region pain referral signs during cervical spine facet closing pattern of motion. Manual interventions for opening during left sidebend and C1-C2 rotation helped to reduce sidebend and rotation symptoms while CT junction and Thoracic PA mobilizations helped to reduce pain felt with extension. Patient also found to have left sided elevated first rib, which was also addressed during this session and added to HEP. She responded well to isometrics at submaximal effort for reducing shoulder elevation as well. Patient will continue to work on postural and strength deficits to meet remainder of her goals.      Cheryl Is progressing well towards her goals.     Patient prognosis is Good.     Patient will continue to benefit from skilled outpatient physical therapy to address the deficits listed in the problem list box on initial evaluation, provide pt/family education and to maximize pt's level of independence in the home and community environment.     Patient's spiritual, cultural and educational needs considered and pt agreeable to plan of care and goals.     Anticipated barriers to physical therapy: none    Goals:   Short Term Goals:  4 weeks  1.Report decreased left shoulder pain < / =  2/10 to increase tolerance for progressing exercises above 90 degrees of flexion. - met   2. Increase  PROM full and pain-free into flexion and abduction in order to improve ability to progress exercises. - Partially met    3. Increased strength by 1/3 MMT grade in Left shoulder RTC and periscapular musculature to increase tolerance for ADL and work activities. - met     4. Pt to tolerate HEP to improve ROM and independence with ADL's - met      Long Term Goals: 10 weeks  1.Report decreased left shoulder pain  </= 1/10  to increase tolerance for progressing resistance exercises in overhead positions. - Progressing, Not met   2.Increase AROM to full and pain-free to show good tolerance for overhead reaching.  - Progressing, Not met   3.Increase strength to >/= 4/5 in Left shoulder RTC and periscapular musculature to increase tolerance for ADL and work activities. - Progressing, Not met   4. Pt goal: improve pain in the left shoulder so that she can return to full work/lifting status and be able to tolerate ADLs; be able to  her 3 y/o child without pain in shoulder. - Progressing, Not met   5. Pt will have improved score of >/= 68% on FOTO shoulder in order to demonstrate true functional improvement. - Progressing, Not met     Plan     Plan of care Certification: 7/11/2024 to 9/30/2024.     Outpatient Physical Therapy 2 times weekly for 10 weeks to include the following interventions: Manual Therapy, Moist Heat/ Ice, Neuromuscular Re-ed, Patient Education, Therapeutic Activities, Therapeutic Exercise, and Ultrasound.     Mk Richey, PT

## 2024-10-23 NOTE — PLAN OF CARE
[facility]   Physical Therapy Re-Evaluation Note      Name: Cheryl Mayer  [facility] Number: [id number]    Therapy Diagnosis:   Encounter Diagnosis   Name Primary?    Decreased range of motion of left shoulder Yes       Physician: Nick Zelaya MD    Visit Date: 10/22/2024    Physician Orders: PT Eval and Treat   Medical Diagnosis from Referral: Encounter related to worker's compensation claim [Z02.6], Sprain of left shoulder, unspecified shoulder sprain type, subsequent encounter [S43.402D], Work related injury [Y99.0]   Evaluation Date: 7/11/2024  Authorization Period Expiration: 7/5/2025  Plan of Care Expiration: 12/3/2024  Progress Note Due:11/22/2024  Date of Surgery: N/A  Visit # / Visits authorized:  7/12   FOTO: 1/ 3     Precautions: Standard      Time In: 5:01 PM  Time Out: 5:59 PM  Total Billable Time: 58 minutes    Subjective     Patient reports: she is feeling better since beginning therapy and has range of motion back in her arm, but she is still having pain near her AC joint.     She was compliant with home exercise program.    Response to previous treatment: slight improvement   Functional change: slightly improved lifting tolerance or motion    Pain: 2/10  Location: left superior/posterior shoulder     Objective      Observation: alert, oriented, calm     Posture: right > Left scapular downward rotation; left > right scapular abduction      Passive Range of Motion:   Shoulder Right Left   Flexion 170 170 *   Abduction 170 170*   ER at 0 70 65   ER at 90 110 90 *   IR 45 35 *      Active Range of Motion:   Shoulder Right Left   Flexion 170 170 *    Abduction 170 170 *   ER at 0 70 65   IR 40 35 *   Reach behind head T2 T2*   Reach behind back  T11   T11*   *denotes pain with motion      Strength:  Shoulder Right Left   Flexion 4+/5 4/5 *   Abduction 4+/5 4/5 *   ER 4/5 4/5 *   IR 4+/5 4-/5 *   Serratus Anterior 3-/5 3/5   Middle Trap 3/5 3/5   Low Trap 3/5 3-/5        "  Special Tests:  Impingement Cluster:    Right Left   Hawkin's Kenndy - +   Painful Arc - +   Resisted ER - +      Rotator Cuff Tear    Right Left   Painful Arc - +   Drop Arm test - -   Resisted ER - +        SLAP:    Right Left   Biceps Load II - +   O'Presley's Test - +       Joint Mobility: limited posterior and inferior glide of L GH joint, hypomobility in upper and mid thoracic spine     Palpation: TTP sciatic notch and near AC joint      Sensation: intact light touch bilaterally       Treatment     Cheryl received the treatments listed below:      therapeutic exercises to develop strength, endurance, and ROM for 12 minutes including:  ER/IR walk out 15x each   Seated thoracic extension 2 x 20 with 3" hold  Sidelying open books, 2 x 20     manual therapy techniques: Joint mobilizations and Soft tissue Mobilization were applied to the: left shoulder, AC joint for 9 minutes, including:  Left first rib mobs, grades II/III  Inferior and posterior GH mobs, grades III/IV  Supine thoracic manipulation (pt agreeable)    Not performed today:  MWM Left rotation at T1-T2, 2 x 15 reps   C1-C2 METs to Left rotation, 4 x 6 sec holds   Cervical spine C4-C5 Side glides, grades II/III  Posterior/inferior mobs Left GH, Grades II/III  Seated assisted PA mobs of upper thoracic spine  Posterior centration with IR submax isometrics, 5 x 10 sec holds   Rhythmic stabilization holds flexion at 90, 100 degr    neuromuscular re-education activities to improve: Coordination, Kinesthetic, Sense, Proprioception, and Posture for 24 minutes. The following activities were included:  Serratus wall slides 2x10   Hand heel rocks 2x20   Prone middle trap level 1 2x15   Prone lower trap level 1 2x15   Prone upper trap level 2 2x15     Not performed today:  Self first rib mobs, 2 x 20   Shoulder Abduction at wall submax isometrics, 5x each with 10" holds   Extension isometrics into wall, 5 x 10 sec holds  Serratus punches at 110 degr with UE " supported on table, 3 x 12  Upper trap shrugs level 2, 5 x 10 sec holds   Prone mid trap level 2, 3 x 10       therapeutic activities to improve functional performance for 00  minutes, including:      *Re-evaluation of ROM, joint mobility, strength, and relevant special tests for 13 minutes.     Patient Education and Home Exercises       Education provided:   - HEP     Written Home Exercises Provided: Patient instructed to cont prior HEP. Exercises were reviewed and Cheryl was able to demonstrate them prior to the end of the session.  Cheryl demonstrated good  understanding of the education provided. See Electronic Medical Record under Patient Instructions for exercises provided during therapy sessions    Assessment     Pt presents today for a re-evaluation due to plan of care expiration since her last visit. Pt still presents with similar problems from her evaluation. Pt has improved significantly with her shoulder ROM, but she still has increased pain with shoulder movements especially at end range of motion. Pt also still has decreased strength,GH joint mobility, and thoracic joint mobility. Pt presents with RC impingement secondary to scapular dysfunction and RC weakness.     Cheryl Is progressing well towards her goals.     Patient prognosis is Good.     Patient will continue to benefit from skilled outpatient physical therapy to address the deficits listed in the problem list box on initial evaluation, provide pt/family education and to maximize pt's level of independence in the home and community environment.     Patient's spiritual, cultural and educational needs considered and pt agreeable to plan of care and goals.     Anticipated barriers to physical therapy: none    Goals:   Short Term Goals:  4 weeks  1.Report decreased left shoulder pain < / =  2/10 to increase tolerance for progressing exercises above 90 degrees of flexion. - met   2. Increase PROM full and pain-free into flexion and abduction in order  to improve ability to progress exercises. - Partially met    3. Increased strength by 1/3 MMT grade in Left shoulder RTC and periscapular musculature to increase tolerance for ADL and work activities. - met     4. Pt to tolerate HEP to improve ROM and independence with ADL's - met      Long Term Goals: 10 weeks  1.Report decreased left shoulder pain  </= 1/10  to increase tolerance for progressing resistance exercises in overhead positions. - Progressing, Not met   2.Increase AROM to full and pain-free to show good tolerance for overhead reaching.  - Progressing, Not met   3.Increase strength to >/= 4/5 in Left shoulder RTC and periscapular musculature to increase tolerance for ADL and work activities. - Progressing, Not met   4. Pt goal: improve pain in the left shoulder so that she can return to full work/lifting status and be able to tolerate ADLs; be able to  her 5 y/o child without pain in shoulder. - Progressing, Not met   5. Pt will have improved score of >/= 68% on FOTO shoulder in order to demonstrate true functional improvement. - Progressing, Not met     Plan     Plan of care Certification: 10/22/2024 to 12/3//2024.     Outpatient Physical Therapy 2 times weekly for 6 weeks to include the following interventions: Manual Therapy, Moist Heat/ Ice, Neuromuscular Re-ed, Patient Education, Therapeutic Activities, Therapeutic Exercise, and Ultrasound.     Mk Richey, PT

## 2024-11-05 ENCOUNTER — CLINICAL SUPPORT (OUTPATIENT)
Dept: REHABILITATION | Facility: HOSPITAL | Age: 29
End: 2024-11-05
Payer: COMMERCIAL

## 2024-11-05 DIAGNOSIS — M25.612 DECREASED RANGE OF MOTION OF LEFT SHOULDER: Primary | ICD-10-CM

## 2024-11-05 PROCEDURE — 97110 THERAPEUTIC EXERCISES: CPT

## 2024-11-05 PROCEDURE — 97140 MANUAL THERAPY 1/> REGIONS: CPT

## 2024-11-05 PROCEDURE — 97112 NEUROMUSCULAR REEDUCATION: CPT

## 2024-11-05 NOTE — PROGRESS NOTES
"[facility] OUTPATIENT THERAPY AND WELLNESS   Physical Therapy Re-Evaluation Note       Name: Cheryl Mayer  Clinic Number: [id number]     Therapy Diagnosis:        Encounter Diagnosis   Name Primary?    Decreased range of motion of left shoulder Yes         Physician: Nick Zelaya MD     Visit Date: 10/22/2024     Physician Orders: PT Eval and Treat   Medical Diagnosis from Referral: Encounter related to worker's compensation claim [Z02.6], Sprain of left shoulder, unspecified shoulder sprain type, subsequent encounter [S43.402D], Work related injury [Y99.0]   Evaluation Date: 7/11/2024  Authorization Period Expiration: 7/5/2025  Plan of Care Expiration: 12/3/2024  Progress Note Due:11/22/2024  Date of Surgery: N/A  Visit # / Visits authorized:  8/12   FOTO: 1/ 3     Precautions: Standard      Time In: 5:05 PM  Time Out: 5:55 PM  Total Billable Time: 50 minutes     Subjective      Patient reports: She has been having a lot of pain on the anterior medial aspect of her shoulder.      She was compliant with home exercise program.     Response to previous treatment: slight improvement   Functional change: slightly improved lifting tolerance or motion     Pain: 2/10  Location: left superior/posterior shoulder      Objective       Objective measures will be taken at progress report unless specified.       Treatment      Cheryl received the treatments listed below:       therapeutic exercises to develop strength, endurance, and ROM for 14 minutes including:  ER/IR walk out 15x each   Seated thoracic extension 2 x 20 with 3" hold  Sidelying open books, 20x (pain)      manual therapy techniques: Joint mobilizations and Soft tissue Mobilization were applied to the: left shoulder, AC joint for 8 minutes, including:  Left first rib mobs, grades II/III  Inferior and posterior GH mobs, grades III/IV  Supine thoracic manipulation (pt agreeable)     Not performed today:  MWM Left rotation at T1-T2, 2 x 15 reps   C1-C2 METs to Left " "rotation, 4 x 6 sec holds   Cervical spine C4-C5 Side glides, grades II/III  Posterior/inferior mobs Left GH, Grades II/III  Seated assisted PA mobs of upper thoracic spine  Posterior centration with IR submax isometrics, 5 x 10 sec holds   Rhythmic stabilization holds flexion at 90, 100 degr     neuromuscular re-education activities to improve: Coordination, Kinesthetic, Sense, Proprioception, and Posture for 28 minutes. The following activities were included:  Serratus wall slides 2x10   Hand heel rocks 2x20   Prone middle trap level 1 2x15   Self first rib mobs, 2 x 20   Prone upper trap level 2 2x15      Not performed today:  Prone lower trap level 1 2x15   Shoulder Abduction at wall submax isometrics, 5x each with 10" holds   Extension isometrics into wall, 5 x 10 sec holds  Serratus punches at 110 degr with UE supported on table, 3 x 12  Upper trap shrugs level 2, 5 x 10 sec holds   Prone mid trap level 2, 3 x 10         therapeutic activities to improve functional performance for 00  minutes, including:        Patient Education and Home Exercises        Education provided:   - HEP      Written Home Exercises Provided: Patient instructed to cont prior HEP. Exercises were reviewed and Cheryl was able to demonstrate them prior to the end of the session.  Cheryl demonstrated good  understanding of the education provided. See Electronic Medical Record under Patient Instructions for exercises provided during therapy sessions     Assessment      Pt presented today with more pain than she did last visit. Pt had some relief of pain at the end of the session, but she was mainly fatigued from the exercises today. Pt had pain with some exercises including prone middle trap level 1 and open books (with arms across chest). Pt will continue to progress as tolerated.      Cheryl Is progressing well towards her goals.      Patient prognosis is Good.      Patient will continue to benefit from skilled outpatient physical therapy " to address the deficits listed in the problem list box on initial evaluation, provide pt/family education and to maximize pt's level of independence in the home and community environment.      Patient's spiritual, cultural and educational needs considered and pt agreeable to plan of care and goals.     Anticipated barriers to physical therapy: none     Goals:   Short Term Goals:  4 weeks  1.Report decreased left shoulder pain < / =  2/10 to increase tolerance for progressing exercises above 90 degrees of flexion. - met   2. Increase PROM full and pain-free into flexion and abduction in order to improve ability to progress exercises. - Partially met    3. Increased strength by 1/3 MMT grade in Left shoulder RTC and periscapular musculature to increase tolerance for ADL and work activities. - met     4. Pt to tolerate HEP to improve ROM and independence with ADL's - met      Long Term Goals: 10 weeks  1.Report decreased left shoulder pain  </= 1/10  to increase tolerance for progressing resistance exercises in overhead positions. - Progressing, Not met   2.Increase AROM to full and pain-free to show good tolerance for overhead reaching.  - Progressing, Not met   3.Increase strength to >/= 4/5 in Left shoulder RTC and periscapular musculature to increase tolerance for ADL and work activities. - Progressing, Not met   4. Pt goal: improve pain in the left shoulder so that she can return to full work/lifting status and be able to tolerate ADLs; be able to  her 3 y/o child without pain in shoulder. - Progressing, Not met   5. Pt will have improved score of >/= 68% on FOTO shoulder in order to demonstrate true functional improvement. - Progressing, Not met      Plan      Plan of care Certification: 10/22/2024 to 12/3//2024.     Outpatient Physical Therapy 2 times weekly for 6 weeks to include the following interventions: Manual Therapy, Moist Heat/ Ice, Neuromuscular Re-ed, Patient Education, Therapeutic Activities,  Therapeutic Exercise, and Ultrasound.      Mk Herrerahorp, PT

## 2024-11-12 ENCOUNTER — OFFICE VISIT (OUTPATIENT)
Dept: URGENT CARE | Facility: CLINIC | Age: 29
End: 2024-11-12
Payer: COMMERCIAL

## 2024-11-12 VITALS — WEIGHT: 156 LBS | BODY MASS INDEX: 30.63 KG/M2 | HEIGHT: 60 IN | RESPIRATION RATE: 18 BRPM

## 2024-11-12 DIAGNOSIS — S43.402D SPRAIN OF LEFT SHOULDER, UNSPECIFIED SHOULDER SPRAIN TYPE, SUBSEQUENT ENCOUNTER: Primary | ICD-10-CM

## 2024-11-12 DIAGNOSIS — Z02.6 ENCOUNTER RELATED TO WORKER'S COMPENSATION CLAIM: ICD-10-CM

## 2024-11-12 PROCEDURE — 99214 OFFICE O/P EST MOD 30 MIN: CPT | Mod: S$GLB,,, | Performed by: SURGERY

## 2024-11-12 NOTE — PROGRESS NOTES
Subjective:      Patient ID: Cheryl Mayer is a 29 y.o. female.    Chief Complaint: Shoulder Injury (Left)    Patient's place of employment - [employer]   Patient's job title - x-ray tech  Date of Injury - 06/21/2024  Body part injured - Left Shoulder   Current work status per last visit - Regular with restrictions   Improved, same, or worse - Improved  Pain Scale right now (1-10) -  0/10  SB.         Shoulder Injury   The incident occurred at work. The left shoulder is affected. The incident occurred more than 1 week ago. The pain is at a severity of 0/10. The patient is experiencing no pain. Pertinent negatives include no chest pain, muscle weakness, numbness or tingling. Nothing aggravates the symptoms.       Cardiovascular:  Negative for chest pain.   Neurological:  Negative for numbness.       See MA note above. Begin MD note:    Cheryl Mayer is a 29 y.o. presenting for follow-up of left shoulder. She has not had any pain of the left shoulder in the last week. She feels improved with PT and is doing the HEP regularly.     Objective:     Physical Exam  Vitals and nursing note reviewed.   Constitutional:       General: She is not in acute distress.     Appearance: She is not ill-appearing.   HENT:      Head: Normocephalic.   Eyes:      Conjunctiva/sclera: Conjunctivae normal.   Pulmonary:      Effort: Pulmonary effort is normal. No respiratory distress.   Musculoskeletal:         General: Normal range of motion.      Comments: FAROM of left shoulder without pain.   Skin:     General: Skin is warm.      Coloration: Skin is not pale.   Neurological:      General: No focal deficit present.      Mental Status: She is alert and oriented to person, place, and time.      GCS: GCS eye subscore is 4. GCS verbal subscore is 5. GCS motor subscore is 6.      Motor: Motor function is intact.      Coordination: Coordination is intact.   Psychiatric:         Attention and Perception: Attention normal.         Mood and Affect:  Mood normal.         Speech: Speech normal.         Behavior: Behavior normal. Behavior is cooperative.         Thought Content: Thought content normal.        Assessment:      1. Sprain of left shoulder, unspecified shoulder sprain type, subsequent encounter    2. Encounter related to worker's compensation claim      Plan:     I reviewed the PT notes related to this injury, she is progressing well in treatment. She will return to regular duty while continuing with PT. RTC if there is any issue.        Diagnoses and plan discussed with the patient, as well as the expected course and duration of symptoms. Risks and benefits of any medication prescribed during this visit was explained, verbal instructions on use given. Clinic/Emergency department return precautions were given, can return to [facility] before scheduled follow-up appointment if notes worsening/aggravation of symptoms. All questions and concerns were addressed prior to discharge. Plan was developed with active input from the patient and they verbalized understanding of and agreement with the POC.  [facility] was informed of any referrals and relevant orders.  Note was dictated with voice recognition software, please excuse any grammatical errors.    I spent a total of 30 minutes on the day of the visit.  This includes face to face time and non-face to face time preparing to see the patient (e.g. review of medical record), obtaining and/or reviewing separately obtained history, documenting clinical information in the electronic or other health record, independently interpreting results and communicating results to the patient/family/caregiver, or care coordinator.    Patient Instructions: Continue Physical Therapy   Restrictions: Regular Duty  Follow up if symptoms worsen or fail to improve.

## 2024-11-12 NOTE — LETTER
[facility]  [address] [address]  Phone: [phone]  Fax: [phone]  [employer]: [email or web address]    Pt Name: Cheryl Mayer  Injury Date: 06/21/2024   Employee ID: [id number] Date of Treatment: 11/12/2024   Company: [employer]      Appointment Time: 03:45 PM Arrived: 4:24 PM   Provider: Nick Zelaya MD Time Out:4:55 PM     Office Treatment:   1. Sprain of left shoulder, unspecified shoulder sprain type, subsequent encounter    2. Encounter related to worker's compensation claim          Patient Instructions: Continue Physical Therapy    Restrictions: Regular Duty       Follow up if symptoms worsen or fail to improve.

## 2024-12-06 ENCOUNTER — OFFICE VISIT (OUTPATIENT)
Dept: PRIMARY CARE CLINIC | Facility: CLINIC | Age: 29
End: 2024-12-06
Payer: COMMERCIAL

## 2024-12-06 VITALS
HEIGHT: 60 IN | WEIGHT: 153.31 LBS | OXYGEN SATURATION: 100 % | HEART RATE: 77 BPM | DIASTOLIC BLOOD PRESSURE: 72 MMHG | SYSTOLIC BLOOD PRESSURE: 100 MMHG | BODY MASS INDEX: 30.1 KG/M2

## 2024-12-06 DIAGNOSIS — R10.9 ABDOMINAL WALL PAIN: ICD-10-CM

## 2024-12-06 DIAGNOSIS — K21.9 GASTROESOPHAGEAL REFLUX DISEASE, UNSPECIFIED WHETHER ESOPHAGITIS PRESENT: Primary | ICD-10-CM

## 2024-12-06 PROBLEM — M25.612 DECREASED RANGE OF MOTION OF LEFT SHOULDER: Status: RESOLVED | Noted: 2024-07-11 | Resolved: 2024-12-06

## 2024-12-06 NOTE — PROGRESS NOTES
29-year-old woman but without significant chronic medical problems on birth control here with a complaint of abdominal/thoracic pain in the mid axillary line on the left.    History of present illness and pertinent review of systems:  The patient's symptoms began on Monday.  Originally she thought it was acid reflux and took no medication.  She has had issues with acid reflux in the past that did take Protonix.  The pain is described as lasting several seconds.  The pain is sharp and burning and may travel from left towards the center.  Notes a fullness in her chest as if she wants to expel gas or belch but is unable to.  Is that these time she may get some of these twinges in her left chest wall or possibly from her stomach.  She notes that this started over the weekend when she consumed a bottle of wine.  She does have a history of acid reflux as noted but no history of ulcers, gastritis, or esophagitis.  The patient has noted no melena.  Otherwise she generally feels well.  She does have a concern that this has related to her heart.  There are no palpitations, unexplained diaphoresis, unexplained nausea, or change in exercise tolerance.  The patient does believes she has a murmur and did have an echo when she has had another institution.    Problem list, medication list, and allergies have been reviewed.  The patient developed hives from stool softener and vancomycin.  She had nonspecific reactions to morphine and penicillin.    Review of systems is otherwise negative    Physical Exam  Vitals and nursing note reviewed.   Constitutional:       General: She is not in acute distress.     Appearance: She is normal weight. She is not ill-appearing, toxic-appearing or diaphoretic.   HENT:      Head: Atraumatic.      Nose: Nose normal.   Eyes:      General: No scleral icterus.     Conjunctiva/sclera: Conjunctivae normal.   Cardiovascular:      Rate and Rhythm: Normal rate and regular rhythm.      Pulses: Normal pulses.       Heart sounds: Normal heart sounds. No murmur heard.     No gallop.      Comments: No murmur was heard.  No murmur was induced either by handgrip or with Valsalva.  Pulmonary:      Effort: Pulmonary effort is normal. No respiratory distress.      Breath sounds: Normal breath sounds. No wheezing or rales.   Abdominal:      General: Bowel sounds are normal.      Palpations: Abdomen is soft. There is no mass.      Tenderness: There is no abdominal tenderness. There is no rebound.      Comments: No hepatosplenomegaly.  The abdominal/chest wall pain could not be reproduced by pushing between the ribs or over the splenic area.   Musculoskeletal:         General: No tenderness. Normal range of motion.      Right lower leg: No edema.      Left lower leg: No edema.   Skin:     Coloration: Skin is not jaundiced or pale.   Neurological:      General: No focal deficit present.      Mental Status: She is alert and oriented to person, place, and time.   Psychiatric:         Mood and Affect: Mood normal.         Behavior: Behavior normal.     Assessment/plan:    Acid reflux, the patient likely has acid reflux triggered by drinking a bottle of wine on Saturday with symptoms beginning shortly thereafter.  This is likely accounting for the chest fullness etc. in the need to feel as if she was belch.  This typically should resolve serve of management.  As an aside the headache which was related to alcohol would be best treated with acetaminophen in not nonsteroidals.    Plan:  Explanation of the above   Pepcid twice daily  Avoid alcohol, nonsteroidal anti-inflammatory drugs, any small meals.    Abdominal wall pain:  There was no presence of a rash in the pain was not reproducible.  The location of the pain in the symptoms are not related to her heart which was the patient has concern.  Review of the chart revealed that over several years the patient has had multiple EKGs.  The presence of a heart murmur was not found even with  maneuvers.  The pain is most consistent with thoracic catch syndrome, possibly costochondritis, and possibly gastric bloating although she was not bloated.  I could not reproduce this pain.  Other concerns might be the initial presence of shingles which may produce dysesthesia for several days before the rash breaks out.    Plan: Observation   The patient should contact myself or this office if she develops a shingles type rash.

## 2024-12-06 NOTE — PATIENT INSTRUCTIONS
Thank you for visiting today.  First of all I want to sure you there is nothing wrong with your heart.  I will tell you however that alcohol can induce palpitations or skipping of the heart particularly if you drink more than you normally would.  It may also be responsible for your headache.  For the headache I recommend that you just use Tylenol arthritis cause there is no rebound from that because it is longer-acting.  I often recommended nonsteroidal anti-inflammatory like Alleve or ibuprofen however with your esophageal irritation or gastric irritation I would stay away from these drugs.  For the chest wall tenderness I might also consider a heating pad as needed when it really bothers you.  I would watch for the appearance of a chicken pox like rash which may occur in his called shingles.  You should get a hold of me as soon as possible because timing of treatment is important  For your stomach and esophagus symptoms I would recommend Pepcid 20 mg twice a day for a proximally a week.  I would avoid alcohol.  I would avoid foods that tend to be more gas forming.  And I would eat smaller meals and spread my meals out.

## 2025-01-08 ENCOUNTER — TELEPHONE (OUTPATIENT)
Dept: OBSTETRICS AND GYNECOLOGY | Facility: CLINIC | Age: 30
End: 2025-01-08
Payer: COMMERCIAL

## 2025-01-08 NOTE — TELEPHONE ENCOUNTER
Tried contacting patient to schedule appointment with Dr. Garcia in vulva clinic, left voicemail for patient.

## 2025-02-03 ENCOUNTER — TELEPHONE (OUTPATIENT)
Dept: OBSTETRICS AND GYNECOLOGY | Facility: CLINIC | Age: 30
End: 2025-02-03
Payer: COMMERCIAL

## 2025-02-03 NOTE — TELEPHONE ENCOUNTER
----- Message from Val sent at 2/3/2025  8:34 AM CST -----  Regarding: r/s appt  Name of Who is Calling:  pt        What is the request in detail:pt is calling to r/s her vulva appt that is this Wed. Please call to schedule          Can the clinic reply by [facility]:          What Number to Call Back if not in [facility]: [phone]

## 2025-02-04 ENCOUNTER — TELEPHONE (OUTPATIENT)
Dept: OBSTETRICS AND GYNECOLOGY | Facility: CLINIC | Age: 30
End: 2025-02-04
Payer: COMMERCIAL

## 2025-02-04 NOTE — TELEPHONE ENCOUNTER
Left a message for patient to call back    Pt was incorrectly scheduled for 2/5 as she is a new patient.

## 2025-02-04 NOTE — TELEPHONE ENCOUNTER
Called pt and explained he's only there one day a week. Appt made with him for vulva and Mei for [employer]

## 2025-02-04 NOTE — TELEPHONE ENCOUNTER
----- Message from [place] sent at 2/4/2025  9:38 AM CST -----      Name of Who is Calling: HEATHER JENNINGS [[id number]]      What is the request in detail: Pt isn't pleased with the appt change and would like something sooner.Please contact to further discuss and advise.            Can the clinic reply by [facility]: Y      What [facility]  [phone]

## 2025-02-04 NOTE — TELEPHONE ENCOUNTER
----- Message from [place] sent at 2/4/2025  9:03 AM CST -----  Regarding: sooner appt  Name of Who is Calling: Cheryl           What is the request in detail: Patient is requesting a call back to be seen before May.            Can the clinic reply by [facility]: Yes           [facility] [phone]

## 2025-02-24 ENCOUNTER — OFFICE VISIT (OUTPATIENT)
Dept: OBSTETRICS AND GYNECOLOGY | Facility: CLINIC | Age: 30
End: 2025-02-24
Payer: COMMERCIAL

## 2025-02-24 VITALS — WEIGHT: 153.44 LBS | HEIGHT: 60 IN | BODY MASS INDEX: 30.12 KG/M2

## 2025-02-24 DIAGNOSIS — N89.8 VAGINA ITCHING: ICD-10-CM

## 2025-02-24 DIAGNOSIS — Z12.4 ENCOUNTER FOR PAPANICOLAOU SMEAR FOR CERVICAL CANCER SCREENING: ICD-10-CM

## 2025-02-24 DIAGNOSIS — Z11.51 SCREENING FOR HPV (HUMAN PAPILLOMAVIRUS): ICD-10-CM

## 2025-02-24 DIAGNOSIS — Z01.419 WOMEN'S ANNUAL ROUTINE GYNECOLOGICAL EXAMINATION: Primary | ICD-10-CM

## 2025-02-24 PROCEDURE — 81515 NFCT DS BV&VAGINITIS DNA ALG: CPT | Performed by: NURSE PRACTITIONER

## 2025-02-24 PROCEDURE — 1159F MED LIST DOCD IN RCRD: CPT | Mod: CPTII,S$GLB,, | Performed by: NURSE PRACTITIONER

## 2025-02-24 PROCEDURE — 87102 FUNGUS ISOLATION CULTURE: CPT | Performed by: NURSE PRACTITIONER

## 2025-02-24 PROCEDURE — 99999 PR PBB SHADOW E&M-EST. PATIENT-LVL III: CPT | Mod: PBBFAC,,, | Performed by: NURSE PRACTITIONER

## 2025-02-24 PROCEDURE — 3008F BODY MASS INDEX DOCD: CPT | Mod: CPTII,S$GLB,, | Performed by: NURSE PRACTITIONER

## 2025-02-24 PROCEDURE — 87491 CHLMYD TRACH DNA AMP PROBE: CPT | Performed by: NURSE PRACTITIONER

## 2025-02-24 PROCEDURE — 99395 PREV VISIT EST AGE 18-39: CPT | Mod: S$GLB,,, | Performed by: NURSE PRACTITIONER

## 2025-02-24 PROCEDURE — 88175 CYTOPATH C/V AUTO FLUID REDO: CPT | Performed by: NURSE PRACTITIONER

## 2025-02-24 PROCEDURE — 87624 HPV HI-RISK TYP POOLED RSLT: CPT | Performed by: NURSE PRACTITIONER

## 2025-02-24 RX ORDER — CLOBETASOL PROPIONATE 0.5 MG/G
OINTMENT TOPICAL 2 TIMES DAILY
Qty: 30 G | Refills: 1 | Status: SHIPPED | OUTPATIENT
Start: 2025-02-24

## 2025-02-24 NOTE — PATIENT INSTRUCTIONS
Clobetasol ointment twice a day for 2 weeks,   Then nightly for 1 week.   Then every Mon and Thurs at bedtime.   Can use Aquaphor or Vitamin A&D to act as barrier/ promote healing

## 2025-02-24 NOTE — PROGRESS NOTES
CC: Annual  HPI: Pt is a 30 y.o.  female who presents for routine annual exam. She is not using  for contraception. She does want STD screening.  She reports vaginal itching persisting for over a year. Denies any odor, fever or DC.        FH:  Breast cancer:mother (dx at 46 - currently cancer free)  Colon cancer: none  Ovarian cancer: none  Endometrial cancer: none    ROS:  GENERAL: Feeling well overall. Denies fever or chills.   SKIN: Denies rash or lesions.   HEAD: Denies head injury or headache.   NODES: Denies enlarged lymph nodes.   CHEST: Denies chest pain or shortness of breath.   CARDIOVASCULAR: Denies palpitations or left sided chest pain.   ABDOMEN: No abdominal pain, constipation, diarrhea, nausea, vomiting or rectal bleeding.   URINARY: No dysuria, hematuria, or burning on urination.  REPRODUCTIVE: See HPI.   BREASTS: Denies pain, lumps, or nipple discharge.   HEMATOLOGIC: No easy bruisability or excessive bleeding.   MUSCULOSKELETAL: Denies joint pain or swelling.   NEUROLOGIC: Denies syncope or weakness.   PSYCHIATRIC: Denies depression, anxiety or mood swings.    PE:   APPEARANCE: Well nourished, well developed, Patient Refused female in no acute distress.  NODES: no cervical, supraclavicular, or inguinal lymphadenopathy  BREASTS: Symmetrical, no skin changes or visible lesions. No palpable masses, nipple discharge or adenopathy bilaterally.  ABDOMEN: Soft. No tenderness or masses. No distention. No hernias palpated. No CVA tenderness.  VULVA: No lesions. Normal external female genitalia. + white discoloration of BL labia majora  No visible lesions compatible with Maximo noted, no ulcerations nor thickened plaque-like areas.   URETHRAL MEATUS: Normal size and location, no lesions, no prolapse.  URETHRA: No masses, tenderness, or prolapse.  VAGINA: Moist. No lesions or lacerations noted. No abnormal discharge present. No odor present.   CERVIX: No lesions or discharge. No cervical motion tenderness.    UTERUS: Normal size, regular shape, mobile, non-tender.  ADNEXA: No tenderness. No fullness or masses palpated in the adnexal regions.   ANUS PERINEUM: Normal.      Diagnosis:  1. Women's annual routine gynecological examination    2. Encounter for Papanicolaou smear for cervical cancer screening    3. Screening for HPV (human papillomavirus)    4. Vagina itching        Plan:   Pap/ HPV  GCCT   Vaginosis cx  Discussed suspect Lichens sclerosis  Clobetasol ointment twice a day for 2 weeks,   Then nightly for 1 week.   Then every Mon and Thurs at bedtime.   Can use Aquaphor or Vitamin A&D to act as barrier/ promote healing   If no response will refer to MD for possible vulva bx    Orders Placed This Encounter    HPV High Risk Genotypes, PCR    CULTURE, FUNGUS    Vaginosis Screen by DNA Probe    C. trachomatis/N. gonorrhoeae by AMP DNA    Liquid-Based Pap Smear, Screening    clobetasol 0.05% (TEMOVATE) 0.05 % Oint       Patient was counseled today on the new ACS guidelines for cervical cytology screening as well as the current recommendations for breast cancer screening. She was counseled to follow up with her PCP for other routine health maintenance. Counseling session lasted approximately 10 minutes, and all her questions were answered.    Follow-up with me in 1 year for routine exam    SERGEI Capone

## 2025-02-27 LAB — FUNGUS SPEC CULT: NORMAL

## 2025-03-01 LAB
BACTERIAL VAGINOSIS DNA: NOT DETECTED
C TRACH DNA SPEC QL NAA+PROBE: NOT DETECTED
CANDIDA GLABRATA/KRUSEI: NOT DETECTED
CANDIDA RRNA VAG QL PROBE: NOT DETECTED
CLINICAL INFO: NORMAL
CYTO CVX: NORMAL
CYTOLOGIST CVX/VAG CYTO: NORMAL
CYTOLOGIST CVX/VAG CYTO: NORMAL
CYTOLOGY CMNT CVX/VAG CYTO-IMP: NORMAL
CYTOLOGY PAP THIN PREP EXPLANATION: NORMAL
DATE OF PREVIOUS PAP: NORMAL
DATE PREVIOUS BX: NO
GEN CATEG CVX/VAG CYTO-IMP: NORMAL
HPV I/H RISK 4 DNA CVX QL NAA+PROBE: NOT DETECTED
LMP START DATE: NORMAL
MICROORGANISM CVX/VAG CYTO: NORMAL
N GONORRHOEA DNA SPEC QL NAA+PROBE: NOT DETECTED
PATHOLOGIST CVX/VAG CYTO: NORMAL
SERVICE CMNT-IMP: NORMAL
SPECIMEN SOURCE CVX/VAG CYTO: NORMAL
STAT OF ADQ CVX/VAG CYTO-IMP: NORMAL
TRICHOMONAS VAGINALIS: NOT DETECTED

## 2025-03-03 ENCOUNTER — RESULTS FOLLOW-UP (OUTPATIENT)
Dept: OBSTETRICS AND GYNECOLOGY | Facility: CLINIC | Age: 30
End: 2025-03-03

## 2025-05-14 ENCOUNTER — OFFICE VISIT (OUTPATIENT)
Dept: OBSTETRICS AND GYNECOLOGY | Facility: CLINIC | Age: 30
End: 2025-05-14
Payer: COMMERCIAL

## 2025-05-14 VITALS — BODY MASS INDEX: 30.43 KG/M2 | WEIGHT: 155 LBS | HEIGHT: 60 IN

## 2025-05-14 DIAGNOSIS — L90.0 LICHEN SCLEROSUS: ICD-10-CM

## 2025-05-14 DIAGNOSIS — L98.9 PSORIASIS-LIKE SKIN DISEASE: ICD-10-CM

## 2025-05-14 DIAGNOSIS — N90.89 VULVAR IRRITATION: Primary | ICD-10-CM

## 2025-05-14 DIAGNOSIS — L25.9 CONTACT DERMATITIS, UNSPECIFIED CONTACT DERMATITIS TYPE, UNSPECIFIED TRIGGER: ICD-10-CM

## 2025-05-14 PROCEDURE — 87102 FUNGUS ISOLATION CULTURE: CPT | Performed by: OBSTETRICS & GYNECOLOGY

## 2025-05-14 PROCEDURE — 99999 PR PBB SHADOW E&M-EST. PATIENT-LVL III: CPT | Mod: PBBFAC,,, | Performed by: OBSTETRICS & GYNECOLOGY

## 2025-05-14 PROCEDURE — 3008F BODY MASS INDEX DOCD: CPT | Mod: CPTII,S$GLB,, | Performed by: OBSTETRICS & GYNECOLOGY

## 2025-05-14 PROCEDURE — 87210 SMEAR WET MOUNT SALINE/INK: CPT | Mod: QW,S$GLB,, | Performed by: OBSTETRICS & GYNECOLOGY

## 2025-05-14 PROCEDURE — 99214 OFFICE O/P EST MOD 30 MIN: CPT | Mod: S$GLB,,, | Performed by: OBSTETRICS & GYNECOLOGY

## 2025-05-14 PROCEDURE — 1159F MED LIST DOCD IN RCRD: CPT | Mod: CPTII,S$GLB,, | Performed by: OBSTETRICS & GYNECOLOGY

## 2025-05-14 RX ORDER — CLOBETASOL PROPIONATE 0.5 MG/G
OINTMENT TOPICAL
Qty: 60 G | Refills: 5 | Status: SHIPPED | OUTPATIENT
Start: 2025-05-14

## 2025-05-14 NOTE — PROGRESS NOTES
Subjective:     Patient ID: Cheryl Mayer is a 30 y.o. female.     Chief Complaint: Lichen Sclerosus     History of Present Illness: This patient is a 30 y.o. female, who presents to the [facility] for evaluation of vulvar itching and discomfort which was improved with the use of clobetasol ointment.  The patient discontinue the use of the clobetasol in the itching and discomfort reoccurred.  The problem began after the patient took a long trip in in the car and sat for a number of hours going and coming back in after this trip the vulvar itching and discomfort occurred.  She has also noted a rash and itching 1 her shoulders and back and knees and elbows. The patient is not using contraception and would like to become pregnant.    Last menstrual period was 04/26/2025    Review of Systems    GENERAL: No fever, chills, fatigability or weightchange  SKIN: No rashes, itching or changes in color or texture of skin.  HEAD: No headaches or recent head trauma.  EYES: Visual acuity fine. No photophobia,r diplopia.  EARS: Denies earache or vertigo  NOSE: No loss of smell, no epistaxis or postnasal drip.  MOUTH & THROAT: No hoarseness or change in voice.   NODES: Denies swollen glands.  CHEST: Denies [place], cyanosis, wheezing, cough and sputum production.  CARDIOVASCULAR: Denies chest pain, PND, orthopnea or reduced exercise tolerance.  ABDOMEN: Appetite fine. No weight loss. bloating, Denies diarrhea, abdominal pain, hematemesis or blood in stool.  URINARY: No flank pain, dysuria or hematuria.  PERIPHERAL VASCULAR: No claudication or cyanosis.Varicosities  MUSCULOSKELETAL: No joint stiffness or swelling. Denies back pain.muscle aches  NEUROLOGIC: No history of seizures, paralysis, alteration of gait or coordination.       Objective:       Physical Exam     APPEARANCE: Well nourished, well developed, in no acute distress.    GENITOURINARY:  Vulva: No lesions. Normal female genital architecture.  No changes compatible with  lichen sclerosis noted  Urethral Meatus: Normal size and location, no lesions, no prolapse.  Urethra: No masses, tenderness, prolapse or scarring.  Vagina:  Moist with rugae, no discharge, no significant cystocele or rectocele.  Cervix: No lesions, normal diameter, no stenosis, no cervical motion tenderness. .  Uterus: 6 week size, regular shape, mobile, non-tender, normal position, good support.  Adnexa: No masses, tenderness or CDS nodularity.  Anus Perineum: No lesions, no relaxation, no external hemorrhoids.  Abdomen: No masses, tenderness, hernia or ascites, no hepatasplenomegaly  Skin: No rashes, lesions, ulcers, acne, hirsutism.  Peripheral/lower extremities: No edema, erythema or tenderness.  Lymphatic: No axillary, neck or groin nodes palp.  Mental Status: Alert, oriented x 3, normal affect and mood.          @PROCEDURE:@  Wet Prep:  pH = 3.0  -WBCS = occasional  -Lactobacilli = excessive  -BV = Amsel negative for clue cells  -Candida = Hyphae negative  -Trichomnas = none seen  -Cells- Basal and Parabasal, Superficial: Maturation:  Superficial cells predominate  -Impression:  Wet prep negative will possible excess lactobacilli as a cause of the itching and burning.  -Treatment:  No vaginal therapy at this time  -[facility] p.r.n. symptoms       Assessment:      1. Vulvar irritation               Plan:  Discussed possible lactobacilliosis versus lichen sclerosus.  The patient has no physical findings compatible with lichen sclerosus.  View of a history a contact dermatitis is also a possible cause of her problem.  If she develops the skin rash again, she should be referred to a dermatologist to rule out eczema and possible psoriasis.  Recommend clobetasol ointment application twice a day for week once a day for week then twice weekly.  Consider switching to Kenalog ointment if the patient becomes pregnant.  Return to clinic PRN symptoms or in 6 months for vulvar revaluation.            No orders of the  defined types were placed in this encounter.

## 2025-06-04 ENCOUNTER — RESULTS FOLLOW-UP (OUTPATIENT)
Dept: OBSTETRICS AND GYNECOLOGY | Facility: CLINIC | Age: 30
End: 2025-06-04

## 2025-08-15 ENCOUNTER — LAB VISIT (OUTPATIENT)
Dept: LAB | Facility: HOSPITAL | Age: 30
End: 2025-08-15
Payer: COMMERCIAL

## 2025-08-15 ENCOUNTER — OFFICE VISIT (OUTPATIENT)
Dept: INTERNAL MEDICINE | Facility: CLINIC | Age: 30
End: 2025-08-15
Payer: COMMERCIAL

## 2025-08-15 VITALS
BODY MASS INDEX: 30.35 KG/M2 | SYSTOLIC BLOOD PRESSURE: 102 MMHG | OXYGEN SATURATION: 99 % | WEIGHT: 154.56 LBS | HEIGHT: 60 IN | TEMPERATURE: 98 F | DIASTOLIC BLOOD PRESSURE: 58 MMHG | HEART RATE: 99 BPM

## 2025-08-15 DIAGNOSIS — E66.811 CLASS 1 OBESITY DUE TO EXCESS CALORIES WITHOUT SERIOUS COMORBIDITY WITH BODY MASS INDEX (BMI) OF 30.0 TO 30.9 IN ADULT: ICD-10-CM

## 2025-08-15 DIAGNOSIS — Z11.3 SCREENING EXAMINATION FOR STD (SEXUALLY TRANSMITTED DISEASE): ICD-10-CM

## 2025-08-15 DIAGNOSIS — Z00.00 ANNUAL PHYSICAL EXAM: Primary | ICD-10-CM

## 2025-08-15 DIAGNOSIS — Z00.00 ANNUAL PHYSICAL EXAM: ICD-10-CM

## 2025-08-15 DIAGNOSIS — E66.09 CLASS 1 OBESITY DUE TO EXCESS CALORIES WITHOUT SERIOUS COMORBIDITY WITH BODY MASS INDEX (BMI) OF 30.0 TO 30.9 IN ADULT: ICD-10-CM

## 2025-08-15 LAB
ALBUMIN SERPL BCP-MCNC: 4.4 G/DL (ref 3.5–5.2)
ALP SERPL-CCNC: 63 UNIT/L (ref 40–150)
ALT SERPL W/O P-5'-P-CCNC: 17 UNIT/L (ref 0–55)
ANION GAP (OHS): 12 MMOL/L (ref 8–16)
AST SERPL-CCNC: 23 UNIT/L (ref 0–50)
BILIRUB SERPL-MCNC: 0.2 MG/DL (ref 0.1–1)
BUN SERPL-MCNC: 16 MG/DL (ref 6–20)
CALCIUM SERPL-MCNC: 8.9 MG/DL (ref 8.7–10.5)
CHLORIDE SERPL-SCNC: 104 MMOL/L (ref 95–110)
CHOLEST SERPL-MCNC: 165 MG/DL (ref 120–199)
CHOLEST/HDLC SERPL: 2.3 {RATIO} (ref 2–5)
CO2 SERPL-SCNC: 20 MMOL/L (ref 23–29)
CREAT SERPL-MCNC: 0.7 MG/DL (ref 0.5–1.4)
EAG (OHS): 103 MG/DL (ref 68–131)
ERYTHROCYTE [DISTWIDTH] IN BLOOD BY AUTOMATED COUNT: 12.9 % (ref 11.5–14.5)
GFR SERPLBLD CREATININE-BSD FMLA CKD-EPI: >60 ML/MIN/1.73/M2
GLUCOSE SERPL-MCNC: 147 MG/DL (ref 70–110)
HBA1C MFR BLD: 5.2 % (ref 4–5.6)
HCT VFR BLD AUTO: 39.1 % (ref 37–48.5)
HCV AB SERPL QL IA: NORMAL
HDLC SERPL-MCNC: 71 MG/DL (ref 40–75)
HDLC SERPL: 43 % (ref 20–50)
HGB BLD-MCNC: 12.3 GM/DL (ref 12–16)
HIV 1+2 AB+HIV1 P24 AG SERPL QL IA: NORMAL
LDLC SERPL CALC-MCNC: 83.4 MG/DL (ref 63–159)
MCH RBC QN AUTO: 26.6 PG (ref 27–31)
MCHC RBC AUTO-ENTMCNC: 31.5 G/DL (ref 32–36)
MCV RBC AUTO: 85 FL (ref 82–98)
NONHDLC SERPL-MCNC: 94 MG/DL
PLATELET # BLD AUTO: 203 K/UL (ref 150–450)
PMV BLD AUTO: 12.5 FL (ref 9.2–12.9)
POTASSIUM SERPL-SCNC: 3.7 MMOL/L (ref 3.5–5.1)
PROT SERPL-MCNC: 7.7 GM/DL (ref 6–8.4)
RBC # BLD AUTO: 4.62 M/UL (ref 4–5.4)
SODIUM SERPL-SCNC: 136 MMOL/L (ref 136–145)
T PALLIDUM IGG+IGM SER QL: NORMAL
TRIGL SERPL-MCNC: 53 MG/DL (ref 30–150)
WBC # BLD AUTO: 9.27 K/UL (ref 3.9–12.7)

## 2025-08-15 PROCEDURE — 85027 COMPLETE CBC AUTOMATED: CPT

## 2025-08-15 PROCEDURE — 84443 ASSAY THYROID STIM HORMONE: CPT

## 2025-08-15 PROCEDURE — 82040 ASSAY OF SERUM ALBUMIN: CPT

## 2025-08-15 PROCEDURE — 83036 HEMOGLOBIN GLYCOSYLATED A1C: CPT

## 2025-08-15 PROCEDURE — 87389 HIV-1 AG W/HIV-1&-2 AB AG IA: CPT

## 2025-08-15 PROCEDURE — 99999 PR PBB SHADOW E&M-EST. PATIENT-LVL III: CPT | Mod: PBBFAC,,,

## 2025-08-15 PROCEDURE — 80061 LIPID PANEL: CPT

## 2025-08-15 PROCEDURE — 86803 HEPATITIS C AB TEST: CPT

## 2025-08-15 PROCEDURE — 86593 SYPHILIS TEST NON-TREP QUANT: CPT

## 2025-08-15 PROCEDURE — 36415 COLL VENOUS BLD VENIPUNCTURE: CPT | Mod: PO

## 2025-08-16 LAB — TSH SERPL-ACNC: 0.57 UIU/ML (ref 0.4–4)
